# Patient Record
Sex: FEMALE | Race: WHITE | NOT HISPANIC OR LATINO | Employment: UNEMPLOYED | URBAN - METROPOLITAN AREA
[De-identification: names, ages, dates, MRNs, and addresses within clinical notes are randomized per-mention and may not be internally consistent; named-entity substitution may affect disease eponyms.]

---

## 2023-04-03 LAB
ALANINE AMINOTRANSFERASE (SGPT) (U/L) IN SER/PLAS: 22 U/L (ref 7–45)
ALBUMIN (G/DL) IN SER/PLAS: 4.6 G/DL (ref 3.4–5)
ALKALINE PHOSPHATASE (U/L) IN SER/PLAS: 79 U/L (ref 33–136)
ASPARTATE AMINOTRANSFERASE (SGOT) (U/L) IN SER/PLAS: 23 U/L (ref 9–39)
BILIRUBIN DIRECT (MG/DL) IN SER/PLAS: 0.1 MG/DL (ref 0–0.3)
BILIRUBIN TOTAL (MG/DL) IN SER/PLAS: 0.5 MG/DL (ref 0–1.2)
PROTEIN TOTAL: 7.5 G/DL (ref 6.4–8.2)

## 2023-04-04 LAB
HEPATITIS A TOTAL AB INTERPRETATION: NONREACTIVE
HEPATITIS B VIRUS CORE AB (PRESENCE) IN SER/PLAS BY IMM: NONREACTIVE
HEPATITIS B VIRUS SURFACE AB (MIU/ML) IN SERUM: <3.1 MIU/ML
HEPATITIS B VIRUS SURFACE AG PRESENCE IN SERUM: NONREACTIVE
HEPATITIS C VIRUS AB PRESENCE IN SERUM: NONREACTIVE

## 2023-09-22 ENCOUNTER — APPOINTMENT (OUTPATIENT)
Dept: PRIMARY CARE | Facility: CLINIC | Age: 68
End: 2023-09-22
Payer: MEDICARE

## 2023-09-30 ENCOUNTER — ANCILLARY PROCEDURE (OUTPATIENT)
Dept: RADIOLOGY | Facility: CLINIC | Age: 68
End: 2023-09-30
Payer: MEDICARE

## 2023-09-30 DIAGNOSIS — M25.561 PAIN IN RIGHT KNEE: ICD-10-CM

## 2023-09-30 PROCEDURE — 73562 X-RAY EXAM OF KNEE 3: CPT | Mod: RIGHT SIDE | Performed by: RADIOLOGY

## 2023-09-30 PROCEDURE — 73562 X-RAY EXAM OF KNEE 3: CPT | Mod: RT

## 2023-10-03 ENCOUNTER — LAB (OUTPATIENT)
Dept: LAB | Facility: LAB | Age: 68
End: 2023-10-03
Payer: MEDICARE

## 2023-10-03 DIAGNOSIS — K76.0 FATTY (CHANGE OF) LIVER, NOT ELSEWHERE CLASSIFIED: Primary | ICD-10-CM

## 2023-10-03 LAB
ALBUMIN SERPL BCP-MCNC: 4.6 G/DL (ref 3.4–5)
ALP SERPL-CCNC: 93 U/L (ref 33–136)
ALT SERPL W P-5'-P-CCNC: 15 U/L (ref 7–45)
AST SERPL W P-5'-P-CCNC: 16 U/L (ref 9–39)
BILIRUB DIRECT SERPL-MCNC: 0.1 MG/DL (ref 0–0.3)
BILIRUB SERPL-MCNC: 0.6 MG/DL (ref 0–1.2)
PROT SERPL-MCNC: 7.4 G/DL (ref 6.4–8.2)

## 2023-10-03 PROCEDURE — 36415 COLL VENOUS BLD VENIPUNCTURE: CPT

## 2023-10-04 ENCOUNTER — OFFICE VISIT (OUTPATIENT)
Dept: ORTHOPEDIC SURGERY | Facility: CLINIC | Age: 68
End: 2023-10-04
Payer: MEDICARE

## 2023-10-04 VITALS — HEIGHT: 66 IN | WEIGHT: 160 LBS | BODY MASS INDEX: 25.71 KG/M2

## 2023-10-04 DIAGNOSIS — M25.561 ACUTE PAIN OF RIGHT KNEE: Primary | ICD-10-CM

## 2023-10-04 DIAGNOSIS — M17.11 PRIMARY OSTEOARTHRITIS OF RIGHT KNEE: ICD-10-CM

## 2023-10-04 PROCEDURE — 1160F RVW MEDS BY RX/DR IN RCRD: CPT

## 2023-10-04 PROCEDURE — 1159F MED LIST DOCD IN RCRD: CPT

## 2023-10-04 PROCEDURE — 1126F AMNT PAIN NOTED NONE PRSNT: CPT

## 2023-10-04 PROCEDURE — 20610 DRAIN/INJ JOINT/BURSA W/O US: CPT

## 2023-10-04 PROCEDURE — 99203 OFFICE O/P NEW LOW 30 MIN: CPT

## 2023-10-04 RX ORDER — LIDOCAINE HYDROCHLORIDE 5 MG/ML
4 INJECTION, SOLUTION INFILTRATION; PERINEURAL
Status: COMPLETED | OUTPATIENT
Start: 2023-10-04 | End: 2023-10-04

## 2023-10-04 RX ORDER — TRIAMCINOLONE ACETONIDE 40 MG/ML
10 INJECTION, SUSPENSION INTRA-ARTICULAR; INTRAMUSCULAR
Status: COMPLETED | OUTPATIENT
Start: 2023-10-04 | End: 2023-10-04

## 2023-10-04 RX ADMIN — TRIAMCINOLONE ACETONIDE 10 MG: 40 INJECTION, SUSPENSION INTRA-ARTICULAR; INTRAMUSCULAR at 14:02

## 2023-10-04 RX ADMIN — LIDOCAINE HYDROCHLORIDE 4 ML: 5 INJECTION, SOLUTION INFILTRATION; PERINEURAL at 14:02

## 2023-10-04 ASSESSMENT — PAIN SCALES - GENERAL: PAINLEVEL_OUTOF10: 10 - WORST POSSIBLE PAIN

## 2023-10-04 ASSESSMENT — PAIN - FUNCTIONAL ASSESSMENT: PAIN_FUNCTIONAL_ASSESSMENT: 0-10

## 2023-10-04 NOTE — LETTER
October 4, 2023     Rica Hauser DO  [address]  [address]  [facility] [address]    Patient: Nirali Rodarte   YOB: 1955   Date of Visit: 10/4/2023       Dear Dr. Rica Hauser DO:    Thank you for referring Nirali Rodarte to me for evaluation. Below are my notes for this consultation.  If you have questions, please do not hesitate to call me. I look forward to following your patient along with you.       Sincerely,     Sonia Charles PA-C      CC: No Recipients  ______________________________________________________________________________________    HPI  Nirali Rodarte is a 68 y.o. female , accompanied by her sister in law, in office today for   Chief Complaint   Patient presents with   • Right Knee - Pain, Edema     One month ago today   .  she denies any injury or illness to the knee, does admit that she was more active prior to the knee starting hurting.  Went to urgent care where they placed into knee immobilizing brace and prescribed a steroid pack which she has not started taking.  Knee hurts all over, hurts to bend and stand.  Has been icing it, having difficulty with elevation.    Past Medical History:  History of liver issues, osteoporosis.    Medication  No current outpatient medications on file prior to visit.     No current facility-administered medications on file prior to visit.       Physical Exam  Constitutional: well developed, well nourished female in no acute distress  Psychiatric: normal mood, appropriate affect  Eyes: sclera anicteric  HENT: normocephalic/atraumatic  CV: regular rate and rhythm   Respiratory: non labored breathing  Integumentary: no rash  Neurological: moves all extremities    Right Knee Exam     Tenderness   The patient is experiencing tenderness in the medial joint line and lateral joint line.    Range of Motion   Extension:  5   Flexion:  100     Tests   Varus: negative Valgus: negative  Lachman:  Anterior - negative    Posterior - negative  Patellar  apprehension: negative    Other   Erythema: absent  Scars: absent  Sensation: normal  Swelling: mild  Effusion: effusion present          L Inj/Asp: R knee on 10/4/2023 2:02 PM  Indications: pain and joint swelling  Details: 22 G needle, anterolateral approach  Medications: 10 mg triamcinolone acetonide 40 mg/mL; 4 mL lidocaine 5 mg/mL (0.5 %)  Outcome: tolerated well, no immediate complications  Procedure, treatment alternatives, risks and benefits explained, specific risks discussed. Consent was given by the patient. Immediately prior to procedure a time out was called to verify the correct patient, procedure, equipment, support staff and site/side marked as required. Patient was prepped and draped in the usual sterile fashion.             Imaging/Lab:  X-rays were taken 9/30/23 which were reviewed by myself and read by radiology and show bone demineralizatin, moderate tricompartmental degenerative changes worst in the patellofemoral compartment.  No effusion of joint.  No acute fracture or dislocation.      Assessment  Assessment: Right knee pain, right knee osteoarthritis    Plan  Plan:  History, physical exam, and imaging were reviewed with patient. Discussed conservative treatment for her knee pain including RICE, bracing, injections, and/or PT.  She would like to try an injection today as well as continuing to brace and ice.  The right knee was injected as above.  Medication: Discussed not taking the prednisone pack since she is getting the injection.  Follow Up: As needed if pain persists or gets worse    All questions were answered for the patient prior to end of exam and patient addressed their understanding.    Sonia Charles PA-C  10/04/23

## 2023-10-04 NOTE — PROGRESS NOTES
HPI  Nirali Rodarte is a 68 y.o. female , accompanied by her sister in law, in office today for   Chief Complaint   Patient presents with    Right Knee - Pain, Edema     One month ago today   .  she denies any injury or illness to the knee, does admit that she was more active prior to the knee starting hurting.  Went to urgent care where they placed into knee immobilizing brace and prescribed a steroid pack which she has not started taking.  Knee hurts all over, hurts to bend and stand.  Has been icing it, having difficulty with elevation.    Past Medical History:  History of liver issues, osteoporosis.    Medication  No current outpatient medications on file prior to visit.     No current facility-administered medications on file prior to visit.       Physical Exam  Constitutional: well developed, well nourished female in no acute distress  Psychiatric: normal mood, appropriate affect  Eyes: sclera anicteric  HENT: normocephalic/atraumatic  CV: regular rate and rhythm   Respiratory: non labored breathing  Integumentary: no rash  Neurological: moves all extremities    Right Knee Exam     Tenderness   The patient is experiencing tenderness in the medial joint line and lateral joint line.    Range of Motion   Extension:  5   Flexion:  100     Tests   Varus: negative Valgus: negative  Lachman:  Anterior - negative    Posterior - negative  Patellar apprehension: negative    Other   Erythema: absent  Scars: absent  Sensation: normal  Swelling: mild  Effusion: effusion present          L Inj/Asp: R knee on 10/4/2023 2:02 PM  Indications: pain and joint swelling  Details: 22 G needle, anterolateral approach  Medications: 10 mg triamcinolone acetonide 40 mg/mL; 4 mL lidocaine 5 mg/mL (0.5 %)  Outcome: tolerated well, no immediate complications  Procedure, treatment alternatives, risks and benefits explained, specific risks discussed. Consent was given by the patient. Immediately prior to procedure a time out was called to  verify the correct patient, procedure, equipment, support staff and site/side marked as required. Patient was prepped and draped in the usual sterile fashion.             Imaging/Lab:  X-rays were taken 9/30/23 which were reviewed by myself and read by radiology and show bone demineralizatin, moderate tricompartmental degenerative changes worst in the patellofemoral compartment.  No effusion of joint.  No acute fracture or dislocation.      Assessment  Assessment: Right knee pain, right knee osteoarthritis    Plan  Plan:  History, physical exam, and imaging were reviewed with patient. Discussed conservative treatment for her knee pain including RICE, bracing, injections, and/or PT.  She would like to try an injection today as well as continuing to brace and ice.  The right knee was injected as above.  Medication: Discussed not taking the prednisone pack since she is getting the injection.  Follow Up: As needed if pain persists or gets worse    All questions were answered for the patient prior to end of exam and patient addressed their understanding.    Sonia Charles PA-C  10/04/23

## 2023-10-05 DIAGNOSIS — K76.0 NON-ALCOHOLIC FATTY LIVER DISEASE: Primary | ICD-10-CM

## 2023-10-06 ENCOUNTER — APPOINTMENT (OUTPATIENT)
Dept: PRIMARY CARE | Facility: CLINIC | Age: 68
End: 2023-10-06
Payer: MEDICARE

## 2023-10-19 ENCOUNTER — OFFICE VISIT (OUTPATIENT)
Dept: PRIMARY CARE | Facility: CLINIC | Age: 68
End: 2023-10-19
Payer: MEDICARE

## 2023-10-19 VITALS
WEIGHT: 163 LBS | SYSTOLIC BLOOD PRESSURE: 192 MMHG | DIASTOLIC BLOOD PRESSURE: 61 MMHG | HEART RATE: 65 BPM | OXYGEN SATURATION: 100 % | BODY MASS INDEX: 26.31 KG/M2

## 2023-10-19 DIAGNOSIS — E55.9 VITAMIN D DEFICIENCY: ICD-10-CM

## 2023-10-19 DIAGNOSIS — Z23 NEED FOR INFLUENZA VACCINATION: ICD-10-CM

## 2023-10-19 DIAGNOSIS — M22.2X2 PATELLOFEMORAL SYNDROME OF BOTH KNEES: Primary | ICD-10-CM

## 2023-10-19 DIAGNOSIS — E78.5 DYSLIPIDEMIA: ICD-10-CM

## 2023-10-19 DIAGNOSIS — M22.2X1 PATELLOFEMORAL SYNDROME OF BOTH KNEES: Primary | ICD-10-CM

## 2023-10-19 DIAGNOSIS — M72.2 PLANTAR FASCIITIS OF RIGHT FOOT: ICD-10-CM

## 2023-10-19 PROCEDURE — 90662 IIV NO PRSV INCREASED AG IM: CPT | Performed by: INTERNAL MEDICINE

## 2023-10-19 PROCEDURE — 1160F RVW MEDS BY RX/DR IN RCRD: CPT | Performed by: INTERNAL MEDICINE

## 2023-10-19 PROCEDURE — 1126F AMNT PAIN NOTED NONE PRSNT: CPT | Performed by: INTERNAL MEDICINE

## 2023-10-19 PROCEDURE — 1036F TOBACCO NON-USER: CPT | Performed by: INTERNAL MEDICINE

## 2023-10-19 PROCEDURE — G0008 ADMIN INFLUENZA VIRUS VAC: HCPCS | Performed by: INTERNAL MEDICINE

## 2023-10-19 PROCEDURE — 99213 OFFICE O/P EST LOW 20 MIN: CPT | Performed by: INTERNAL MEDICINE

## 2023-10-19 PROCEDURE — 1159F MED LIST DOCD IN RCRD: CPT | Performed by: INTERNAL MEDICINE

## 2023-10-19 ASSESSMENT — PATIENT HEALTH QUESTIONNAIRE - PHQ9
2. FEELING DOWN, DEPRESSED OR HOPELESS: SEVERAL DAYS
1. LITTLE INTEREST OR PLEASURE IN DOING THINGS: SEVERAL DAYS
SUM OF ALL RESPONSES TO PHQ9 QUESTIONS 1 AND 2: 2

## 2023-10-19 NOTE — PROGRESS NOTES
Subjective   Patient ID: Nirali Rodarte is a 68 y.o. female who presents for Follow-up (Right knee / concerns ).    She couldn't walk, started 9/6  Was very swollen and was icing it.   She went to urgent care because she couldn't move  She had xrays and got a prescription as well.   She went to the ortho clinic and got her knee injected with steroids and has been better  She took it easy the first week, so she started walking a little more  She is not sure when she can go back to her classes, at the [employer]    She was hiking 3-4 times a week and classes at the [employer]  She gets knee pain, going down a hill was more painful going down then up    In the early summer was having pain in the right foot  Her great toe knuckle got red and tender  Did not hurt at night  She could not walk barefoot through the house  First out of bed, was the worse  Only hurt on the top of the foot  Did not hurt to put on shoes, shoes felt better               Review of Systems    Objective   BP (!) 192/61   Pulse 65   Wt 73.9 kg (163 lb)   SpO2 100%   BMI 26.31 kg/m²     Physical Exam  Cardiovascular:      Rate and Rhythm: Normal rate and regular rhythm.      Pulses: Normal pulses.   Musculoskeletal:      Comments: Right knee  Crepitans on extension  Negative rohan and drawer test  No warmth   Slight suprapatellar effusion    Right ankle, no effusion, not warm  No foot or toe swelling  Tight calf noted  No achilles tenderness   Psychiatric:      Comments: Pt tearful when taking about her brother         Assessment/Plan          Patient Instructions   Right knee pain  Reviewed xray, likely more related to patellofemoral syndrome  Go to PT for treatment and home exercise program but if not better to see ortho and get MRI to check for meniscal tear    Flu shot today    Right foot pain sounds a bit like plantar fascitis, do calf stretches, runners stretch, or heel drop, 2-3 times a day    Great job on diet, weight loss, and limiting  alcohol    Bp is high her but has been fine at home, check home bp and call in readings, or drop off

## 2023-10-19 NOTE — PATIENT INSTRUCTIONS
Right knee pain  Reviewed xray, likely more related to patellofemoral syndrome  Go to PT for treatment and home exercise program but if not better to see ortho and get MRI to check for meniscal tear    Flu shot today    Right foot pain sounds a bit like plantar fascitis, do calf stretches, runners stretch, or heel drop, 2-3 times a day    Great job on diet, weight loss, and limiting alcohol    Bp is high her but has been fine at home, check home bp and call in readings, or drop off

## 2023-10-19 NOTE — PROGRESS NOTES
Subjective   Patient ID: Nirali Rodarte is a 68 y.o. female who presents for Follow-up (Right knee / concerns ).    HPI     Review of Systems    Objective   Wt 73.9 kg (163 lb)   BMI 26.31 kg/m²     Physical Exam    Assessment/Plan

## 2023-10-24 ENCOUNTER — EVALUATION (OUTPATIENT)
Dept: PHYSICAL THERAPY | Facility: CLINIC | Age: 68
End: 2023-10-24
Payer: MEDICARE

## 2023-10-24 DIAGNOSIS — M22.2X2 PATELLOFEMORAL PAIN SYNDROME OF BOTH KNEES: ICD-10-CM

## 2023-10-24 DIAGNOSIS — M22.2X1 PATELLOFEMORAL PAIN SYNDROME OF BOTH KNEES: ICD-10-CM

## 2023-10-24 DIAGNOSIS — M25.661 DECREASED RANGE OF MOTION (ROM) OF RIGHT KNEE: Primary | ICD-10-CM

## 2023-10-24 DIAGNOSIS — M22.2X1 PATELLOFEMORAL SYNDROME OF BOTH KNEES: ICD-10-CM

## 2023-10-24 DIAGNOSIS — M22.2X2 PATELLOFEMORAL SYNDROME OF BOTH KNEES: ICD-10-CM

## 2023-10-24 DIAGNOSIS — R29.898 WEAKNESS OF RIGHT LOWER EXTREMITY: ICD-10-CM

## 2023-10-24 PROBLEM — M22.2X9 PATELLOFEMORAL PAIN SYNDROME: Status: ACTIVE | Noted: 2023-10-24

## 2023-10-24 PROCEDURE — 97110 THERAPEUTIC EXERCISES: CPT | Mod: GP | Performed by: PHYSICAL THERAPIST

## 2023-10-24 PROCEDURE — 97161 PT EVAL LOW COMPLEX 20 MIN: CPT | Mod: GP | Performed by: PHYSICAL THERAPIST

## 2023-10-24 ASSESSMENT — ENCOUNTER SYMPTOMS
DEPRESSION: 0
LOSS OF SENSATION IN FEET: 0
OCCASIONAL FEELINGS OF UNSTEADINESS: 1

## 2023-10-24 ASSESSMENT — PAIN - FUNCTIONAL ASSESSMENT: PAIN_FUNCTIONAL_ASSESSMENT: 0-10

## 2023-10-24 ASSESSMENT — PAIN SCALES - GENERAL: PAINLEVEL_OUTOF10: 2

## 2023-10-24 NOTE — Clinical Note
October 24, 2023     Patient: Nirali Rodarte   YOB: 1955   Date of Visit: 10/24/2023       To Whom it May Concern:    Nirali Rodarte was seen in my clinic on 10/24/2023. She {Return to school/sport:19928}.    If you have any questions or concerns, please don't hesitate to call.         Sincerely,          Yana Rose, PT        CC: No Recipients

## 2023-10-24 NOTE — PROGRESS NOTES
Physical Therapy    Physical Therapy Evaluation and Treatment      Patient Name: Nirali Rodarte  MRN: [id number]  Today's Date: 10/24/2023  Time Calculation  Start Time: 1651  Stop Time: 1735  Time Calculation (min): 44 min      Assessment:  Patient is a 68 year old who presents to Physical therapy secondary to pain in R knee. Upon PT evaluation the patient is presenting with the following deficits: significant weakness in R hip adductors, decreased ROM R knee flexion and extension, trigger pointed noted in R quad, and decreased gait mechanics and poor eccentric control on stairs.     The above deficits are limiting patient's ability to hike/perform daily activities without pain.  Secondary to the above deficits the patient will benefit from skilled PT intervention to allow the patient to progress to the goal of being able to walk/hike without pain.  PT will monitor progress towards goals and adjust intervention as appropriate.     PT Assessment Results: Decreased strength, Decreased range of motion, Decreased endurance, Decreased mobility, Pain  Rehab Prognosis: Excellent    Plan:  Treatment/Interventions: Cryotherapy, Dry needling, Education/ Instruction, Electrical stimulation, Gait training, Manual therapy, Therapeutic activities, Taping techniques, Therapeutic exercises  PT Plan: Skilled PT  PT Frequency: 2 times per week  Duration: 4 weeks  Rehab Potential: Excellent  Plan of Care Agreement: Patient    Current Problem:   1. Decreased range of motion (ROM) of right knee        2. Patellofemoral syndrome of both knees  Referral to Physical Therapy    Follow Up In Physical Therapy      3. Weakness of right lower extremity        4. Patellofemoral pain syndrome of both knees            Subjective    General:  General  Reason for Referral: Patellofemoral pain syndrom  Referred By: Dr. Hauser  Past Medical History Relevant to Rehab: nonePain started in September in R knee as she was hiking and has been gradually  increasing.  Patient did have an xray in Sept and then had injection 10/4/23 which did decrease pain.  Patient did have pain yesterday but today minimal pain.  Before injection pain was 10/10 and she could not walk and yesterday pain was 6/10.  Patient reported that walking and weightbearing increased pain.  Patient does have some difficulty bending and discomfort with hyperextension when walking on uneven surfaces.   Before injection she could not get comfortable.  Patient reported grinding in B knees.    Precautions:  Patient does rely on UE support and does perform step to with gait   Precautions  STEADI Fall Risk Score (The score of 4 or more indicates an increased risk of falling): 3 (message sent to Dr. Hauser regarding PHQ2)  Precautions Comment: none    Pain:  Pain Assessment  Pain Assessment: 0-10  Pain Score: 2  Pain Type: Acute pain  Pain Location: Knee  Pain Orientation: Right  Home Living:   Lives alone   Prior Level of Function:  Prior Function Per Pt/Caregiver Report  Level of Ruth: Independent with ADLs and functional transfers (Very active)  Vocational: Retired  Leisure: hikes, exercises 3 x per weekIndependent    Objective        General Assessments:  Flexibility Comment: TIghtness in B hamstrings    Palpation Comment: pain to palpation R medial knee joint line   Functional Assessments:  Gait Comment: Decreased weightbearing R LE, genu valgum  , Stairs Comment: Reciprical pattern but heavy reliance on UE and poor R knee control when leading with L  , Transfers Comment: Independent  , and Comment: Edema noted along R inferior patella and L medial patella.  R>L - B ankle pronation       Hip AROM WFL unless documented below     Hip PROM WFL unless documented below     Specific Lower Extremity MMT WFL unless documented below  R Iliopsoas: (5/5): 3+  L Iliopsoas: (5/5): 4+  R Gluteals (prone): (5/5): 4-  L Gluteals (prone): (5/5): 4  R Gluteals (sidelying): (5/5): 3+  L Gluteals (sidelying):  (5/5): 4+  R knee flexion: (5/5): 4+  L knee flexion: (5/5): 5  R knee extension: (5/5): 5  L knee extension: (5/5): 5       Special Tests Negative unless documented below  Special Tests Negative: yes         Flexibility  R hamstrings: Tight  L hamstrings: Tight  R hip flexors: WNL  L hip flexors: WNL  R quads: fair  L quads: Fair, KNEE    Knee Functional Rating Scale  LEFS /80: 27         Knee Palpation/Joint Mobility Assessment  Palpation/Joint Mobility Comment: SIgnificant trigger points along R distal quad - poor patellar mobility with lateral tracking  Knee AROM WFL unless documented below  R knee flexion: (140°): 95 with pain  L knee flexion: (140°): 135  R knee extension: (0°): lacking 5 from full extension  L knee extension: (0°): 0  Knee PROM WFL unless documented below  R knee flexion: (140°): 100 with pain  L knee flexion: (140°): 140  R knee extension: (0°): lacking 3 from full extension  L knee extension: (0°): 0    Special Tests Negative unless documented below  Special Tests Negative: yes       Ankle AROM WFL unless documented below        Ankle MMT WFL unless documented below            OP EDUCATION:  Education  Individual(s) Educated: Patient  Education Provided: Anatomy, Home Exercise Program, POC  Patient/Caregiver Demonstrated Understanding: yes  Plan of Care Discussed and Agreed Upon: yes  Patient Response to Education: Patient/Caregiver Performed Return Demonstration of Exercises/Activities, Patient/Caregiver Verbalized Understanding of Information  Access Code: [email or web address]  URL: [email or web address]  Date: 10/24/2023  Prepared by: Yana Rose    Exercises  - Supine Hip Adduction Isometric with Ball  - 1 x daily - 7 x weekly - 2 sets - 10 reps - 5 hold  - Supine Active Straight Leg Raise  - 1 x daily - 7 x weekly - 2 sets - 10 reps  - Straight Leg Raise with External Rotation  - 1 x daily - 7 x weekly - 2 sets - 10 reps    Goals:  Active       PT Problem       Patient to be  able to hike x 2 miles without having to stop secondary to an increase in pain       Start:  10/24/23    Expected End:  11/21/23            Patient to demonstrate improved R LE strength to 4+/5 to allow for improved gait mechanics        Start:  10/24/23    Expected End:  11/21/23            Patient to demonstrate improved R knee ROM to 0-130 to allow for improved ability to perform daily activities        Start:  10/24/23    Expected End:  11/21/23            Patient will demonstrate independence and compliance with safe and appropriate HEP for pain reduction, ROM/flexibility, core and BLE strength and postural correction.        Start:  10/24/23    Expected End:  11/21/23            Patient will demonstrate improved score on LEFS  by 8 points to demonstrate improved subjective functional mobility and ability to perform daily activities.          Start:  10/24/23    Expected End:  11/21/23            Patient to report pain in R knee 1/10 at worst to allow for ability to return to normal activity        Start:  10/24/23    Expected End:  11/21/23

## 2023-10-24 NOTE — Clinical Note
October 24, 2023     Patient: Nirali Rodarte   YOB: 1955   Date of Visit: 10/24/2023       To Whom It May Concern:    It is my medical opinion that Nirali Rodarte {Work release (duty restriction):55427}.    If you have any questions or concerns, please don't hesitate to call.         Sincerely,        Yana Rose, PT    CC: No Recipients

## 2023-10-26 ENCOUNTER — LAB (OUTPATIENT)
Dept: LAB | Facility: LAB | Age: 68
End: 2023-10-26
Payer: MEDICARE

## 2023-10-26 DIAGNOSIS — E78.5 DYSLIPIDEMIA: ICD-10-CM

## 2023-10-26 DIAGNOSIS — E55.9 VITAMIN D DEFICIENCY: ICD-10-CM

## 2023-10-26 PROBLEM — K57.90 DIVERTICULOSIS: Status: ACTIVE | Noted: 2023-10-26

## 2023-10-26 PROBLEM — D18.01 HEMANGIOMA OF SKIN AND SUBCUTANEOUS TISSUE: Status: ACTIVE | Noted: 2023-01-11

## 2023-10-26 PROBLEM — D22.5 MELANOCYTIC NEVI OF TRUNK: Status: ACTIVE | Noted: 2023-01-11

## 2023-10-26 PROBLEM — L50.9 URTICARIA: Status: ACTIVE | Noted: 2023-10-26

## 2023-10-26 PROBLEM — M76.30 IT BAND SYNDROME, UNSPECIFIED LATERALITY: Status: ACTIVE | Noted: 2023-10-26

## 2023-10-26 PROBLEM — R13.19 ESOPHAGEAL DYSPHAGIA: Status: ACTIVE | Noted: 2023-10-26

## 2023-10-26 PROBLEM — L82.1 OTHER SEBORRHEIC KERATOSIS: Status: ACTIVE | Noted: 2023-01-11

## 2023-10-26 PROBLEM — B97.7 HPV (HUMAN PAPILLOMA VIRUS) INFECTION: Status: ACTIVE | Noted: 2023-10-26

## 2023-10-26 PROBLEM — L70.9 ADULT ACNE: Status: ACTIVE | Noted: 2023-10-26

## 2023-10-26 PROBLEM — G89.29 CHRONIC RUQ PAIN: Status: ACTIVE | Noted: 2023-10-26

## 2023-10-26 PROBLEM — L91.8 OTHER HYPERTROPHIC DISORDERS OF THE SKIN: Status: ACTIVE | Noted: 2023-01-11

## 2023-10-26 PROBLEM — D22.60 MELANOCYTIC NEVI OF UNSPECIFIED UPPER LIMB, INCLUDING SHOULDER: Status: ACTIVE | Noted: 2023-01-11

## 2023-10-26 PROBLEM — R10.9 FLANK PAIN: Status: ACTIVE | Noted: 2023-10-26

## 2023-10-26 PROBLEM — D22.70 MELANOCYTIC NEVI OF UNSPECIFIED LOWER LIMB, INCLUDING HIP: Status: ACTIVE | Noted: 2023-01-11

## 2023-10-26 PROBLEM — F41.9 ANXIETY: Status: ACTIVE | Noted: 2023-10-26

## 2023-10-26 PROBLEM — R10.2 SUPRAPUBIC ABDOMINAL PAIN: Status: ACTIVE | Noted: 2023-10-26

## 2023-10-26 PROBLEM — L81.4 OTHER MELANIN HYPERPIGMENTATION: Status: ACTIVE | Noted: 2023-01-11

## 2023-10-26 PROBLEM — M70.61 TROCHANTERIC BURSITIS OF RIGHT HIP: Status: ACTIVE | Noted: 2023-10-26

## 2023-10-26 PROBLEM — M81.0 OSTEOPOROSIS: Status: ACTIVE | Noted: 2023-10-26

## 2023-10-26 PROBLEM — R87.810 CERVICAL HIGH RISK HPV (HUMAN PAPILLOMAVIRUS) TEST POSITIVE: Status: ACTIVE | Noted: 2023-10-26

## 2023-10-26 PROBLEM — M81.0 POSTMENOPAUSAL BONE LOSS: Status: ACTIVE | Noted: 2023-10-26

## 2023-10-26 PROBLEM — Z78.0 ASYMPTOMATIC MENOPAUSAL STATE: Status: ACTIVE | Noted: 2023-10-26

## 2023-10-26 PROBLEM — R01.1 HEART MURMUR: Status: ACTIVE | Noted: 2023-10-26

## 2023-10-26 PROBLEM — K76.0 HEPATIC STEATOSIS: Status: ACTIVE | Noted: 2023-10-26

## 2023-10-26 PROBLEM — R10.11 CHRONIC RUQ PAIN: Status: ACTIVE | Noted: 2023-10-26

## 2023-10-26 LAB
25(OH)D3 SERPL-MCNC: 31 NG/ML (ref 30–100)
ALBUMIN SERPL BCP-MCNC: 4.3 G/DL (ref 3.4–5)
ALP SERPL-CCNC: 81 U/L (ref 33–136)
ALT SERPL W P-5'-P-CCNC: 21 U/L (ref 7–45)
ANION GAP SERPL CALC-SCNC: 14 MMOL/L (ref 10–20)
AST SERPL W P-5'-P-CCNC: 21 U/L (ref 9–39)
BASOPHILS # BLD AUTO: 0.07 X10*3/UL (ref 0–0.1)
BASOPHILS NFR BLD AUTO: 1.3 %
BILIRUB SERPL-MCNC: 0.6 MG/DL (ref 0–1.2)
BUN SERPL-MCNC: 16 MG/DL (ref 6–23)
CALCIUM SERPL-MCNC: 9.7 MG/DL (ref 8.6–10.3)
CHLORIDE SERPL-SCNC: 104 MMOL/L (ref 98–107)
CHOLEST SERPL-MCNC: 178 MG/DL (ref 0–199)
CHOLESTEROL/HDL RATIO: 2
CO2 SERPL-SCNC: 26 MMOL/L (ref 21–32)
CREAT SERPL-MCNC: 0.74 MG/DL (ref 0.5–1.05)
EOSINOPHIL # BLD AUTO: 0.14 X10*3/UL (ref 0–0.7)
EOSINOPHIL NFR BLD AUTO: 2.6 %
ERYTHROCYTE [DISTWIDTH] IN BLOOD BY AUTOMATED COUNT: 13.1 % (ref 11.5–14.5)
GFR SERPL CREATININE-BSD FRML MDRD: 88 ML/MIN/1.73M*2
GLUCOSE SERPL-MCNC: 90 MG/DL (ref 74–99)
HCT VFR BLD AUTO: 41.6 % (ref 36–46)
HDLC SERPL-MCNC: 87.3 MG/DL
HGB BLD-MCNC: 13.2 G/DL (ref 12–16)
IMM GRANULOCYTES # BLD AUTO: 0.02 X10*3/UL (ref 0–0.7)
IMM GRANULOCYTES NFR BLD AUTO: 0.4 % (ref 0–0.9)
LDLC SERPL CALC-MCNC: 81 MG/DL
LYMPHOCYTES # BLD AUTO: 1.2 X10*3/UL (ref 1.2–4.8)
LYMPHOCYTES NFR BLD AUTO: 21.9 %
MCH RBC QN AUTO: 28.9 PG (ref 26–34)
MCHC RBC AUTO-ENTMCNC: 31.7 G/DL (ref 32–36)
MCV RBC AUTO: 91 FL (ref 80–100)
MONOCYTES # BLD AUTO: 0.49 X10*3/UL (ref 0.1–1)
MONOCYTES NFR BLD AUTO: 8.9 %
NEUTROPHILS # BLD AUTO: 3.57 X10*3/UL (ref 1.2–7.7)
NEUTROPHILS NFR BLD AUTO: 64.9 %
NON HDL CHOLESTEROL: 91 MG/DL (ref 0–149)
NRBC BLD-RTO: 0 /100 WBCS (ref 0–0)
PLATELET # BLD AUTO: 260 X10*3/UL (ref 150–450)
PMV BLD AUTO: 10.7 FL (ref 7.5–11.5)
POTASSIUM SERPL-SCNC: 3.9 MMOL/L (ref 3.5–5.3)
PROT SERPL-MCNC: 7.4 G/DL (ref 6.4–8.2)
RBC # BLD AUTO: 4.57 X10*6/UL (ref 4–5.2)
SODIUM SERPL-SCNC: 140 MMOL/L (ref 136–145)
TRIGL SERPL-MCNC: 49 MG/DL (ref 0–149)
TSH SERPL-ACNC: 2.03 MIU/L (ref 0.44–3.98)
VLDL: 10 MG/DL (ref 0–40)
WBC # BLD AUTO: 5.5 X10*3/UL (ref 4.4–11.3)

## 2023-10-26 PROCEDURE — 85025 COMPLETE CBC W/AUTO DIFF WBC: CPT

## 2023-10-26 PROCEDURE — 36415 COLL VENOUS BLD VENIPUNCTURE: CPT

## 2023-10-26 PROCEDURE — 82306 VITAMIN D 25 HYDROXY: CPT

## 2023-10-26 PROCEDURE — 80053 COMPREHEN METABOLIC PANEL: CPT

## 2023-10-26 PROCEDURE — 84443 ASSAY THYROID STIM HORMONE: CPT

## 2023-10-26 PROCEDURE — 80061 LIPID PANEL: CPT

## 2023-10-26 RX ORDER — ASPIRIN 325 MG
TABLET, DELAYED RELEASE (ENTERIC COATED) ORAL
COMMUNITY
Start: 2023-02-10 | End: 2023-12-21 | Stop reason: WASHOUT

## 2023-10-26 RX ORDER — TRETINOIN 0.25 MG/G
1 CREAM TOPICAL NIGHTLY
COMMUNITY
Start: 2023-01-11 | End: 2023-11-02

## 2023-10-26 RX ORDER — SODIUM FLUORIDE 5 MG/G
GEL, DENTIFRICE DENTAL
COMMUNITY
Start: 2022-08-23 | End: 2024-04-12 | Stop reason: WASHOUT

## 2023-10-26 RX ORDER — AMOXICILLIN AND CLAVULANATE POTASSIUM 875; 125 MG/1; MG/1
1 TABLET, FILM COATED ORAL EVERY 12 HOURS
COMMUNITY
Start: 2023-08-24 | End: 2023-11-02

## 2023-10-27 ENCOUNTER — TREATMENT (OUTPATIENT)
Dept: PHYSICAL THERAPY | Facility: CLINIC | Age: 68
End: 2023-10-27
Payer: MEDICARE

## 2023-10-27 DIAGNOSIS — R29.898 WEAKNESS OF RIGHT LOWER EXTREMITY: ICD-10-CM

## 2023-10-27 DIAGNOSIS — M22.2X2 PATELLOFEMORAL PAIN SYNDROME OF BOTH KNEES: ICD-10-CM

## 2023-10-27 DIAGNOSIS — M22.2X1 PATELLOFEMORAL PAIN SYNDROME OF BOTH KNEES: ICD-10-CM

## 2023-10-27 DIAGNOSIS — M22.2X1 PATELLOFEMORAL SYNDROME OF BOTH KNEES: ICD-10-CM

## 2023-10-27 DIAGNOSIS — M22.2X2 PATELLOFEMORAL SYNDROME OF BOTH KNEES: ICD-10-CM

## 2023-10-27 DIAGNOSIS — M25.661 DECREASED RANGE OF MOTION (ROM) OF RIGHT KNEE: Primary | ICD-10-CM

## 2023-10-27 PROCEDURE — 97140 MANUAL THERAPY 1/> REGIONS: CPT | Mod: GP | Performed by: PHYSICAL THERAPIST

## 2023-10-27 PROCEDURE — 97110 THERAPEUTIC EXERCISES: CPT | Mod: GP | Performed by: PHYSICAL THERAPIST

## 2023-10-27 ASSESSMENT — PAIN SCALES - GENERAL: PAINLEVEL_OUTOF10: 2

## 2023-10-27 NOTE — PROGRESS NOTES
Physical Therapy    Physical Therapy Treatment    Patient Name: Nirali Rodarte  MRN: [id number]  Today's Date: 10/27/2023  Time Calculation  Start Time: 0900  Stop Time: 0944  Time Calculation (min): 44 min      Assessment:    Patient tolerated addition of exercises well without pain.  Patient had significant trigger points in distal quad with good response with manual today.  Patient was educated to continue original HEP and added in stretches today.      Plan:   Continue per POC.  Monitor response to treatment and adjust plan as needed.         Current Problem  1. Decreased range of motion (ROM) of right knee        2. Patellofemoral pain syndrome of both knees        3. Weakness of right lower extremity        4. Patellofemoral syndrome of both knees  Follow Up In Physical Therapy          Subjective   General  Reason for Referral: Patellofemoral pain syndrom  Referred By: Dr. Hauser  Precautions  Precautions  Precautions Comment: noneSTEADI Fall Risk Score (The score of 4 or more indicates an increased risk of falling): 3 (message sent to Dr. Hauser regarding PHQ2)  Precautions Comment: none  Vital Signs     Pain  Pain Score: 2  Pain Type: Acute pain  Pain Location: Knee  Pain Orientation: Right    Objective       Treatments:  Therapeutic Exercise  Therapeutic Exercise Activity 1: Sci Fit x 6 minutes leavel 1 resistance seat 9  Therapeutic Exercise Activity 2: SLR 2 x 15  Therapeutic Exercise Activity 3: SLR with hip ER 2 x 10  Therapeutic Exercise Activity 4: hooklying hip adduction 2 x 10  Therapeutic Exercise Activity 5: ITB stretch x 30 seconds x 3  Therapeutic Exercise Activity 6: hamstring stretch on mat table (long sit) x 30 seconds x 2 times each  Therapeutic Exercise Activity 7: prone quad stretch 3 x 30 seconds with strap  Therapeutic Exercise Activity 8: Bridging 2 x 10  Therapeutic Exercise Activity 9: sidelying hip abduction 2 x 10  Therapeutic Exercise Activity 10: prone hip extension 2 x  10  Therapeutic Exercise Activity 11: prone hamstring curl 2 x 10    Manual Therapy  Manual Therapy Activity 1: STM and trigger point release to R quads and foam roll to IT band      Goals:  Active       PT Problem       Patient to be able to hike x 2 miles without having to stop secondary to an increase in pain       Start:  10/24/23    Expected End:  11/21/23            Patient to demonstrate improved R LE strength to 4+/5 to allow for improved gait mechanics        Start:  10/24/23    Expected End:  11/21/23            Patient to demonstrate improved R knee ROM to 0-130 to allow for improved ability to perform daily activities        Start:  10/24/23    Expected End:  11/21/23            Patient will demonstrate independence and compliance with safe and appropriate HEP for pain reduction, ROM/flexibility, core and BLE strength and postural correction.        Start:  10/24/23    Expected End:  11/21/23            Patient will demonstrate improved score on LEFS  by 8 points to demonstrate improved subjective functional mobility and ability to perform daily activities.          Start:  10/24/23    Expected End:  11/21/23            Patient to report pain in R knee 1/10 at worst to allow for ability to return to normal activity        Start:  10/24/23    Expected End:  11/21/23

## 2023-10-31 ENCOUNTER — APPOINTMENT (OUTPATIENT)
Dept: PHYSICAL THERAPY | Facility: CLINIC | Age: 68
End: 2023-10-31
Payer: MEDICARE

## 2023-11-01 ENCOUNTER — TREATMENT (OUTPATIENT)
Dept: PHYSICAL THERAPY | Facility: CLINIC | Age: 68
End: 2023-11-01
Payer: MEDICARE

## 2023-11-01 DIAGNOSIS — M22.2X2 PATELLOFEMORAL SYNDROME OF BOTH KNEES: ICD-10-CM

## 2023-11-01 DIAGNOSIS — M22.2X1 PATELLOFEMORAL SYNDROME OF BOTH KNEES: ICD-10-CM

## 2023-11-01 PROCEDURE — 97110 THERAPEUTIC EXERCISES: CPT | Mod: GP | Performed by: PHYSICAL THERAPIST

## 2023-11-01 PROCEDURE — 97140 MANUAL THERAPY 1/> REGIONS: CPT | Mod: GP | Performed by: PHYSICAL THERAPIST

## 2023-11-01 NOTE — PROGRESS NOTES
Physical Therapy    Physical Therapy Treatment    Patient Name: Nirali Rodarte  MRN: [id number]  Today's Date: 11/1/2023  Time Calculation  Start Time: 0915  Stop Time: 1000  Time Calculation (min): 45 min      Assessment: Patient felt a little discomfort or pressure in the right knee with step-down exercise, but was tolerable.  She is doing all exercises with good form.  There are residual tight bands of muscle in right lateral quad that improved with massage.  She denied right knee pain at the end of the session.         Plan: Continue per POC, focusing on balancing medial quad strength and improving flexibility laterally for improved patellar tracking.         Current Problem  1. Patellofemoral syndrome of both knees  Follow Up In Physical Therapy          Subjective   General:  Patient reports that she is not having pain currently.  She walked 50 minutes without pain at the park yesterday without having any pain or swelling.  She purchased off -the-shelf Powerstep orthotics and is uncertain if they are appropriate.  She is doing her HEP 1-2x per day.   Reason for Referral: Patellofemoral pain syndrom  Referred By: Dr. Hauser  Precautions  Precautions  Precautions Comment: none       Pain: 0/10       Objective               Treatments:  Therapeutic Exercise  Therapeutic Exercise Activity 1: Sci Fit x 6 minutes leavel 1 resistance seat 9 (Unavailable)  Therapeutic Exercise Activity 2: SLR 2 x 15  Therapeutic Exercise Activity 3: SLR with hip ER 2 x 10 (performed at home)  Therapeutic Exercise Activity 4: hooklying hip adduction 2 x 10 (deferred)  Therapeutic Exercise Activity 5: ITB stretch x 30 seconds x 3, ok to use wall for balance  Therapeutic Exercise Activity 6: seated, hamstring stretch on mat table (long sit) x 30 seconds x 2 times each  Therapeutic Exercise Activity 7: prone quad stretch 3 x 30 seconds with strap (deferred)  Therapeutic Exercise Activity 8: Bridging 2 x 10 (deferred)  Therapeutic  "Exercise Activity 9: sidelying hip abduction x 20  Therapeutic Exercise Activity 10: prone hip extension 2 x 10  Therapeutic Exercise Activity 11: prone hamstring curl 2 x 10 (deferred)  Therapeutic Exercise Activity 12: LAQ with hip adduction 2 x 15 R/L  Therapeutic Exercise Activity 13: Step dip 4\" step bwd/fwd 2 x 10 R/L  Therapeutic Exercise Activity 14: Step up on 4\" step 2 x 10           OP EDUCATION: Instructed to use ice to right knee after walking/hiking even if no pain or swelling.  Begin with use of orthotics, progress slowly, just wearing them for a short time at first, then with increasing time.    Access Code: [email or web address]  URL: [email or web address]  Date: 11/01/2023  Prepared by: Anna Burgos    Exercises  - Sidelying Hip Abduction  - 1 x daily - 7 x weekly - 2 sets - 10 reps  - Prone Hip Extension with Plantarflexion  - 1 x daily - 7 x weekly - 2 sets - 10 reps       Goals:  Active       PT Problem       Patient to be able to hike x 2 miles without having to stop secondary to an increase in pain       Start:  10/24/23    Expected End:  11/21/23            Patient to demonstrate improved R LE strength to 4+/5 to allow for improved gait mechanics        Start:  10/24/23    Expected End:  11/21/23            Patient to demonstrate improved R knee ROM to 0-130 to allow for improved ability to perform daily activities        Start:  10/24/23    Expected End:  11/21/23            Patient will demonstrate independence and compliance with safe and appropriate HEP for pain reduction, ROM/flexibility, core and BLE strength and postural correction.        Start:  10/24/23    Expected End:  11/21/23            Patient will demonstrate improved score on LEFS  by 8 points to demonstrate improved subjective functional mobility and ability to perform daily activities.          Start:  10/24/23    Expected End:  11/21/23            Patient to report pain in R knee 1/10 at worst to allow for " ability to return to normal activity        Start:  10/24/23    Expected End:  11/21/23

## 2023-11-02 ENCOUNTER — OFFICE VISIT (OUTPATIENT)
Dept: PRIMARY CARE | Facility: CLINIC | Age: 68
End: 2023-11-02
Payer: MEDICARE

## 2023-11-02 VITALS
DIASTOLIC BLOOD PRESSURE: 69 MMHG | HEIGHT: 66 IN | SYSTOLIC BLOOD PRESSURE: 124 MMHG | WEIGHT: 163 LBS | BODY MASS INDEX: 26.2 KG/M2

## 2023-11-02 DIAGNOSIS — Z00.00 ROUTINE GENERAL MEDICAL EXAMINATION AT HEALTH CARE FACILITY: Primary | ICD-10-CM

## 2023-11-02 DIAGNOSIS — F41.9 ANXIETY: ICD-10-CM

## 2023-11-02 PROCEDURE — 1036F TOBACCO NON-USER: CPT | Performed by: INTERNAL MEDICINE

## 2023-11-02 PROCEDURE — G0439 PPPS, SUBSEQ VISIT: HCPCS | Performed by: INTERNAL MEDICINE

## 2023-11-02 PROCEDURE — 99213 OFFICE O/P EST LOW 20 MIN: CPT | Performed by: INTERNAL MEDICINE

## 2023-11-02 PROCEDURE — 1126F AMNT PAIN NOTED NONE PRSNT: CPT | Performed by: INTERNAL MEDICINE

## 2023-11-02 PROCEDURE — 1159F MED LIST DOCD IN RCRD: CPT | Performed by: INTERNAL MEDICINE

## 2023-11-02 PROCEDURE — 1170F FXNL STATUS ASSESSED: CPT | Performed by: INTERNAL MEDICINE

## 2023-11-02 PROCEDURE — 1160F RVW MEDS BY RX/DR IN RCRD: CPT | Performed by: INTERNAL MEDICINE

## 2023-11-02 RX ORDER — CITALOPRAM 10 MG/1
10 TABLET ORAL DAILY
Qty: 30 TABLET | Refills: 1 | Status: SHIPPED | OUTPATIENT
Start: 2023-11-02 | End: 2023-12-21 | Stop reason: WASHOUT

## 2023-11-02 ASSESSMENT — ACTIVITIES OF DAILY LIVING (ADL)
BATHING: INDEPENDENT
GROCERY_SHOPPING: INDEPENDENT
TAKING_MEDICATION: INDEPENDENT
MANAGING_FINANCES: INDEPENDENT
DOING_HOUSEWORK: INDEPENDENT
DRESSING: INDEPENDENT

## 2023-11-02 NOTE — PATIENT INSTRUCTIONS
"Medicare wellness    Labs reviewed and excellent    Great job with diet and exercise!    Mammogram in may 2023 normal, due in May 2024  Bone density due in 2024/after September  Colonoscopy due in 2029, normal in 2019    I recommend that you get the shingrix shots. Shingles vaccination requires a 2 shots series divided by 2 to 6 months. Get at the pharmacy. Ask the pharmacist \"how much\" prior to injected due cost varies based on your insurance. Shingrix can make you feel tired and achy for a few days after so do not get it prior to a big event. Is free now at the pharmacy    Immunizations otherwise up to date other than declines covid vaccination    Anxiety- mild range and long standing symptoms  Trial of citalopram 10mg daily. Very mild  Recommend counseling with cognitive behavioral therapy, this helps retrain your brain and often leads to no need for medication/medication wean    Left shoulder pain, likely rotator cuff tendonitis. From overuse  Do range of motion. If not better to orthopedics      Ways to Help Prevent Falls at Home    Quick Tips   ? Ask for help if you need it. Most people want to help!   ? Get up slowly after sitting or laying down   ? Wear a medical alert device or keep cell phone in your pocket   ? Use night lights, especially areas near a bathroom   ? Keep the items you use often within reach on a small stool or end table   ? Use an assistive device such as walker or cane, as directed by provider/physical therapy   ? Use a non-slip mat and grab bars in your bathroom. Look for home health sections for best options     Other Areas to Focus On   ? Exercise and nutrition: Regular exercise or taking a falls prevention class are great ways improve strength and balance. Don’t forget to stay hydrated and bring a snack!   ? Medicine side effects: Some medicines can make you sleepy or dizzy, which could cause a fall. Ask your healthcare provider about the side effects your medicines could cause. Be " sure to let them know if you take any vitamins or supplements as well.   ? Tripping hazards: Remove items you could trip on, such as loose mats, rugs, cords, and clutter. Wear closed toe shoes with rubber soles.   ? Health and wellness: Get regular checkups with your healthcare provider, plus routine vision and hearing screenings. Talk with your healthcare provider about:   o Your medicines and the possible side effects - bring them in a bag if that is easier!   o Problems with balance or feeling dizzy   o Ways to promote bone health, such as Vitamin D and calcium supplements   o Questions or concerns about falling     *Ask your healthcare team if you have questions     ©[facility], 2022

## 2023-11-02 NOTE — PROGRESS NOTES
"Subjective   Reason for Visit: Nirali Rodarte is an 68 y.o. female here for a Medicare Wellness visit.     Past Medical, Surgical, and Family History reviewed and updated in chart.    Reviewed all medications by prescribing practitioner or clinical pharmacist (such as prescriptions, OTCs, herbal therapies and supplements) and documented in the medical record.    She got covid  She has always been anxious, but has been more tearful her brother passed, he  1 year and 3 months ago  He was her only person left    She some times is afraid to go to bed at [facility] and is afraid she will not wake up. And that nobody will find her or know that she .   She gets nervous around people and thinking that they will have a negative feeling of her.  She went to PT and loves it and they offered her a job, this made her happy.  When she is around people she is comfortable  She looks at her life and she didn't do anything great.   She stopped in to counselor and they do not take her insurance.  She feels she needs to go back to work because she feels it gives her worth.    She doesn't want to get a covid shot.     Is doing PT and her knee is feeling better. She has home exercises and doing massage.    She has tried antidepressants in the past, paxil, she did not like it. Dr. Mendez, also on another medicine but doesn't remember the name of the meds.     No cp or pressure  No sob  Bowels are regular  No urinary issues, but drinks a lot of water, nocturia 1-4 times a night    She is concerned about getting shingrix due to brother  shortly after shingrix shots.                 Patient Care Team:  Rica Hauser DO as PCP - General  Rica Hauser DO as PCP - [employer] PCP     Review of Systems    Objective   Vitals:  /80   Ht 1.676 m (5' 6\")   Wt 73.9 kg (163 lb)   BMI 26.31 kg/m²       Physical Exam  Constitutional:       Appearance: Normal appearance.   Neck:      Vascular: No carotid bruit. "   Cardiovascular:      Rate and Rhythm: Normal rate and regular rhythm.      Heart sounds: Murmur (trace murmur rusb to lusb, mid systolic) heard.   Pulmonary:      Effort: Pulmonary effort is normal.   Chest:   Breasts:     Right: No inverted nipple, mass or skin change.      Left: No inverted nipple, mass or skin change.   Abdominal:      Palpations: There is no mass.      Tenderness: There is no abdominal tenderness.      Comments: No HSM   Musculoskeletal:      Right lower leg: No edema.      Left lower leg: No edema.      Comments: Shoulders with full rom  Tender without weakness with resisted abduction/int rotation left and with ext rotation in adduction left   Lymphadenopathy:      Cervical: No cervical adenopathy.      Upper Body:      Right upper body: No supraclavicular or axillary adenopathy.      Left upper body: No supraclavicular or axillary adenopathy.   Skin:     Findings: Rash (excoriation left posterior shoulder) present. No bruising, erythema or lesion.   Neurological:      Mental Status: She is alert and oriented to person, place, and time.   Psychiatric:         Behavior: Behavior normal.         Thought Content: Thought content normal.      Comments: PHQ9 is 4  GAD7 is 9         Assessment/Plan   Problem List Items Addressed This Visit    None  Visit Diagnoses       Routine general medical examination at health care facility    -  Primary

## 2023-11-02 NOTE — PROGRESS NOTES
"Subjective   Patient ID: Nirali Rodarte is a 68 y.o. female who presents for Medicare Annual Wellness Visit Subsequent.    HPI     Review of Systems    Objective   /80   Ht 1.676 m (5' 6\")   Wt 73.9 kg (163 lb)   BMI 26.31 kg/m²     Physical Exam    Assessment/Plan          Patient was identified as a fall risk. Risk prevention instructions provided.  "

## 2023-11-03 ENCOUNTER — TREATMENT (OUTPATIENT)
Dept: PHYSICAL THERAPY | Facility: CLINIC | Age: 68
End: 2023-11-03
Payer: MEDICARE

## 2023-11-03 DIAGNOSIS — M22.2X1 PATELLOFEMORAL SYNDROME OF BOTH KNEES: ICD-10-CM

## 2023-11-03 DIAGNOSIS — M22.2X2 PATELLOFEMORAL SYNDROME OF BOTH KNEES: ICD-10-CM

## 2023-11-03 PROCEDURE — 97110 THERAPEUTIC EXERCISES: CPT | Mod: GP | Performed by: PHYSICAL THERAPIST

## 2023-11-03 PROCEDURE — 97140 MANUAL THERAPY 1/> REGIONS: CPT | Mod: GP | Performed by: PHYSICAL THERAPIST

## 2023-11-03 NOTE — PROGRESS NOTES
"Physical Therapy    Physical Therapy Treatment    Patient Name: Nirali Rodarte  MRN: [id number]  Today's Date: 11/3/2023  Time Calculation  Start Time: 1327  Stop Time: 1415  Time Calculation (min): 48 min      Assessment: Patient is doing very well with current program, with no complaints of pain and improving mobility and ability to participate in recreational activities. She demonstrates lack of eccentric control and some discomfort in right knee with step dips, but pain does not persist after the exercise.  She will benefit from continued therapy to assist with returning fully to PLOF.        Plan: Continue with POC. Attempt mini squats, progress to 6\" step with step ups, continue with 4\" step with eccentric work.        Current Problem  1. Patellofemoral syndrome of both knees  Follow Up In Physical Therapy          Subjective   General Patient has no new complaints.  She went for a walk yesterday on a flatter surface.  She had no pain during or afterward.  She is doing her exercise classes, but avoiding any squats or lunges.  She has not yet tried the orthotic yet.  Reason for Referral: Patellofemoral pain syndrom  Referred By: Dr. Hauser  Precautions  Precautions  Precautions Comment: noneSTEADI: 3     Pain:0-10 Right knee       Objective           Treatments:  Therapeutic Exercise  Therapeutic Exercise Activity 1: Sci Fit x 6 minutes leavel 1 resistance seat 9 (Unavailable)  Therapeutic Exercise Activity 2: SLR 2 x 10, 1#  Therapeutic Exercise Activity 3: SLR with hip ER 2 x 10, 1#  Therapeutic Exercise Activity 4: hooklying hip adduction 2 x 10  Therapeutic Exercise Activity 5: ITB stretch x 30 seconds x 3, ok to use wall for balance (deferred)  Therapeutic Exercise Activity 6: seated, hamstring stretch on mat table (long sit) self directed between exercises  Therapeutic Exercise Activity 7: prone quad stretch 3 x 30 seconds with strap)  Therapeutic Exercise Activity 8: Bridging 2 x 10 " "(deferred)  Therapeutic Exercise Activity 9: sidelying hip abduction x 20  Therapeutic Exercise Activity 10: prone hip extension 2 x 10  Therapeutic Exercise Activity 11: standing hamstring curl 2 x 10, 1#  Therapeutic Exercise Activity 12: LAQ with hip adduction 2 x 15 R/L, 1#  Therapeutic Exercise Activity 13: Step dip 4\" step bwd/fwd 2 x 10 R/L  Therapeutic Exercise Activity 14: Step up on 4\" step with opposite hip flexion 2 x 10  Therapeutic Exercise Activity 15: Standing HS curl 2 x 10, 1#  Therapeutic Exercise Activity 16: SLow march and hold x 2 laps.      OP EDUCATION:  Access Code: [email or web address]  URL: [email or web address]  Date: 11/03/2023  Prepared by: Anna Burgos    Exercises  - Prone Quadriceps Stretch with Strap  - 1 x daily - 7 x weekly - 1 sets - 3 reps - 30 seconds hold       Goals:  Active       PT Problem       Patient to be able to hike x 2 miles without having to stop secondary to an increase in pain       Start:  10/24/23    Expected End:  11/21/23            Patient to demonstrate improved R LE strength to 4+/5 to allow for improved gait mechanics        Start:  10/24/23    Expected End:  11/21/23            Patient to demonstrate improved R knee ROM to 0-130 to allow for improved ability to perform daily activities        Start:  10/24/23    Expected End:  11/21/23            Patient will demonstrate independence and compliance with safe and appropriate HEP for pain reduction, ROM/flexibility, core and BLE strength and postural correction.        Start:  10/24/23    Expected End:  11/21/23            Patient will demonstrate improved score on LEFS  by 8 points to demonstrate improved subjective functional mobility and ability to perform daily activities.          Start:  10/24/23    Expected End:  11/21/23            Patient to report pain in R knee 1/10 at worst to allow for ability to return to normal activity        Start:  10/24/23    Expected End:  11/21/23          "

## 2023-11-10 ENCOUNTER — APPOINTMENT (OUTPATIENT)
Dept: PHYSICAL THERAPY | Facility: CLINIC | Age: 68
End: 2023-11-10
Payer: MEDICARE

## 2023-11-13 ENCOUNTER — TREATMENT (OUTPATIENT)
Dept: PHYSICAL THERAPY | Facility: CLINIC | Age: 68
End: 2023-11-13
Payer: MEDICARE

## 2023-11-13 DIAGNOSIS — M22.2X1 PATELLOFEMORAL PAIN SYNDROME OF BOTH KNEES: ICD-10-CM

## 2023-11-13 DIAGNOSIS — M22.2X1 PATELLOFEMORAL SYNDROME OF BOTH KNEES: ICD-10-CM

## 2023-11-13 DIAGNOSIS — M25.661 DECREASED RANGE OF MOTION (ROM) OF RIGHT KNEE: Primary | ICD-10-CM

## 2023-11-13 DIAGNOSIS — M22.2X2 PATELLOFEMORAL PAIN SYNDROME OF BOTH KNEES: ICD-10-CM

## 2023-11-13 DIAGNOSIS — R29.898 WEAKNESS OF RIGHT LOWER EXTREMITY: ICD-10-CM

## 2023-11-13 DIAGNOSIS — M22.2X2 PATELLOFEMORAL SYNDROME OF BOTH KNEES: ICD-10-CM

## 2023-11-13 PROCEDURE — 97110 THERAPEUTIC EXERCISES: CPT | Mod: GP | Performed by: PHYSICAL THERAPIST

## 2023-11-13 NOTE — PROGRESS NOTES
Physical Therapy    Physical Therapy Treatment    Patient Name: Nirali Rodarte  MRN: [id number]  Today's Date: 11/13/2023  Time Calculation  Start Time: 1458  Stop Time: 1543  Time Calculation (min): 45 min      Assessment:   Patient did not have any reports of pain during session today.  PT was able to add 2# today and progress exercises without significant difficulty.   Standing mini wall caused significant crepitus and mild pain so exercise was stopped.  Plan to continue to strengthen as tolerated.      Plan:   Continue per POC.  Monitor response to treatment and adjust plan as needed.         Current Problem  1. Decreased range of motion (ROM) of right knee        2. Patellofemoral pain syndrome of both knees        3. Weakness of right lower extremity        4. Patellofemoral syndrome of both knees  Follow Up In Physical Therapy          Subjective   General  Reason for Referral: Patellofemoral pain syndrom  Referred By: Dr. HauserPatient reported she walked up and down hills over the weekend and she did have some discomfort but today she has minimal pain.  She is doing HEP at home and she feels her strength and ROM is improving.    Precautions   none    Pain   No pain     Objective     Therapeutic Exercise  Therapeutic Exercise Activity 1: Sci Fit x 6 minutes level 2 resistance seat 9  Therapeutic Exercise Activity 2: SLR 2 x 10, 2#  Therapeutic Exercise Activity 3: SLR with hip ER 2 x 10, 2#  Therapeutic Exercise Activity 6: seated, hamstring stretch on mat table (long sit) self directed between exercises  Therapeutic Exercise Activity 7: lateral step ups onto 4 inch step  Therapeutic Exercise Activity 8: Bridging 2 x 10 (deferred)  Therapeutic Exercise Activity 9: sidelying hip abduction x 20 2 x 10  Therapeutic Exercise Activity 10: prone hip extension 2 x 10 2 #  Therapeutic Exercise Activity 11: standing hamstring curl 2 x 10, 2#  Therapeutic Exercise Activity 12: LAQ with hip adduction 2 x 15 R/L,  "2#  Therapeutic Exercise Activity 13: Step dip 4\" step bwd/fwd 2 x 10 R/L  Therapeutic Exercise Activity 14: Step up on 4\" step with opposite hip flexion 2 x 10  Therapeutic Exercise Activity 15: standing sidestepping with purple band x 4 laps  Therapeutic Exercise Activity 16: Slow march and hold x 4 laps with 2# B LE  Therapeutic Exercise Activity 17: mini wall slides x 5  Therapeutic Exercise Activity 18: heel raises x 20    OP EDUCATION:   Educated to continue with HEP     Goals:  Active       PT Problem       Patient to be able to hike x 2 miles without having to stop secondary to an increase in pain       Start:  10/24/23    Expected End:  11/21/23            Patient to demonstrate improved R LE strength to 4+/5 to allow for improved gait mechanics        Start:  10/24/23    Expected End:  11/21/23            Patient to demonstrate improved R knee ROM to 0-130 to allow for improved ability to perform daily activities        Start:  10/24/23    Expected End:  11/21/23            Patient will demonstrate independence and compliance with safe and appropriate HEP for pain reduction, ROM/flexibility, core and BLE strength and postural correction.        Start:  10/24/23    Expected End:  11/21/23            Patient will demonstrate improved score on LEFS  by 8 points to demonstrate improved subjective functional mobility and ability to perform daily activities.          Start:  10/24/23    Expected End:  11/21/23            Patient to report pain in R knee 1/10 at worst to allow for ability to return to normal activity        Start:  10/24/23    Expected End:  11/21/23               "

## 2023-11-15 ENCOUNTER — TREATMENT (OUTPATIENT)
Dept: PHYSICAL THERAPY | Facility: CLINIC | Age: 68
End: 2023-11-15
Payer: MEDICARE

## 2023-11-15 DIAGNOSIS — M22.2X2 PATELLOFEMORAL SYNDROME OF BOTH KNEES: ICD-10-CM

## 2023-11-15 DIAGNOSIS — M25.661 DECREASED RANGE OF MOTION (ROM) OF RIGHT KNEE: Primary | ICD-10-CM

## 2023-11-15 DIAGNOSIS — M22.2X1 PATELLOFEMORAL PAIN SYNDROME OF RIGHT KNEE: ICD-10-CM

## 2023-11-15 DIAGNOSIS — R29.898 WEAKNESS OF RIGHT LOWER EXTREMITY: ICD-10-CM

## 2023-11-15 DIAGNOSIS — M22.2X1 PATELLOFEMORAL SYNDROME OF BOTH KNEES: ICD-10-CM

## 2023-11-15 PROCEDURE — 97110 THERAPEUTIC EXERCISES: CPT | Mod: GP | Performed by: PHYSICAL THERAPIST

## 2023-11-15 NOTE — PROGRESS NOTES
"Physical Therapy    Physical Therapy Treatment    Patient Name: Nirali Rodarte  MRN: [id number]  Today's Date: 11/17/2023  Time Calculation  Start Time: 1350  Stop Time: 1440  Time Calculation (min): 50 min      Assessment: Patient demonstrated improved eccentric control without pain on 4\" step, so advanced her to 6\" step and added to HEP to assist with down hill walking and descending stairs.  She appears to be progressing very well, returning to previous activities with minimal to no right knee pain.  She would continue to benefit from PT to improve eccentric control and strength to optimize stair negotiation and downhill walking.        Plan: Assess effectiveness of tape in reducing pain with downhill walking, trial other technique if indicated/patient willing.        Current Problem  1. Decreased range of motion (ROM) of right knee        2. Patellofemoral syndrome of both knees  Follow Up In Physical Therapy      3. Weakness of right lower extremity        4. Patellofemoral pain syndrome of right knee            Subjective   General: Patient reports now being able to do a free squat in [employer] class with no knee pain.  Did not like doing the wall slide last session.  Still has some pain/discomfort in anterior right knee when walking downhill. She is still taking the stairs in a step-to fashion. Is still icing knee.  She purchased a different pair of orthotics and tried them for a short time as instructed and liked the feeling of support.    Reason for Referral: Patellofemoral pain syndrom  Referred By: Dr. Hauser  Precautions: STEADi score 4  Precautions  Precautions Comment: none       Pain: 0/10       Objective             Treatments:  Therapeutic Exercise  Therapeutic Exercise Activity 1: Sci Fit x 6 minutes level 2 resistance seat 9  Therapeutic Exercise Activity 2: SLR 2 x 10, 2#  Therapeutic Exercise Activity 3: SLR with hip ER 2 x 10, 2#  Therapeutic Exercise Activity 6: seated, hamstring stretch on mat " "table (long sit) self directed between exercises  Therapeutic Exercise Activity 7: lateral step ups onto 6 inch step x 20 R/L  Therapeutic Exercise Activity 8: Bridging 2 x 10 (deferred)  Therapeutic Exercise Activity 9: sidelying hip abduction x 20 2 x 10 (deferred)  Therapeutic Exercise Activity 10: prone hip extension 2 x 10 2 # (deferred)  Therapeutic Exercise Activity 11: standing hamstring curl 2 x 10, 2#  Therapeutic Exercise Activity 12: LAQ with hip adduction 2 x 15 R/L, 2#  Therapeutic Exercise Activity 13: Step dip 4\" step bwd/fwd  x 10 R/L  Therapeutic Exercise Activity 14: Step up on 6\" step with opposite hip flexion 2 x 10  Therapeutic Exercise Activity 15: standing sidestepping with purple band x 4 laps (deferred)  Therapeutic Exercise Activity 16: Slow march and hold x 4 laps with 2# B LE  Therapeutic Exercise Activity 17: mini wall slides x 5 (deferred)  Therapeutic Exercise Activity 18: heel raises x 20  Therapeutic Exercise Activity 19: step dip 6\" x 10 R/L  Therapeutic Exercise Activity 20: SLS on R/L 2 x 30\"    Manual Therapy  Manual Therapy Performed: Yes  Manual Therapy Activity 1: Therapeutic taping 2, I-strips were applied to right knee to surround the patella.  1st strip was anchored proximally at medial quads, pulled laterally around patella and anchored at inferior medial knee.  The 2nd strip was anchored proximally at lateral quads, pulled medially around patella and anchored at inferior lateral knee.       OP EDUCATION: The patient was educated on the risks and benefits of therapeutic taping.  She denied allergy to adhesives and consented to placement of the tape.  She was educated on 3-day wear schedule, ability to shower with the tape on.  She was instructed to remove the tape immediately if there is any skin reaction including itching or irritation, or if the tape is ineffective, or beginning to peel.  She was advised to removed the tape slowly in a rolling fashion and to soak the " tape with oil to ease removal if there tape becomes adhered.  She verbally expressed understanding.     Access Code: [email or web address]  URL: [email or web address]  Date: 11/15/2023  Prepared by: Anna Burgos    Exercises  - Forward Step Down  - 1 x daily - 7 x weekly - 2 sets - 10 reps       Goals:  Active       PT Problem       Patient to be able to hike x 2 miles without having to stop secondary to an increase in pain       Start:  10/24/23    Expected End:  11/21/23            Patient to demonstrate improved R LE strength to 4+/5 to allow for improved gait mechanics        Start:  10/24/23    Expected End:  11/21/23            Patient to demonstrate improved R knee ROM to 0-130 to allow for improved ability to perform daily activities        Start:  10/24/23    Expected End:  11/21/23            Patient will demonstrate independence and compliance with safe and appropriate HEP for pain reduction, ROM/flexibility, core and BLE strength and postural correction.        Start:  10/24/23    Expected End:  11/21/23            Patient will demonstrate improved score on LEFS  by 8 points to demonstrate improved subjective functional mobility and ability to perform daily activities.          Start:  10/24/23    Expected End:  11/21/23            Patient to report pain in R knee 1/10 at worst to allow for ability to return to normal activity        Start:  10/24/23    Expected End:  11/21/23

## 2023-11-21 ENCOUNTER — TREATMENT (OUTPATIENT)
Dept: PHYSICAL THERAPY | Facility: CLINIC | Age: 68
End: 2023-11-21
Payer: MEDICARE

## 2023-11-21 DIAGNOSIS — R29.898 WEAKNESS OF RIGHT LOWER EXTREMITY: ICD-10-CM

## 2023-11-21 DIAGNOSIS — M22.2X9 PATELLOFEMORAL STRESS SYNDROME, UNSPECIFIED LATERALITY: ICD-10-CM

## 2023-11-21 DIAGNOSIS — M22.2X1 PATELLOFEMORAL SYNDROME OF BOTH KNEES: ICD-10-CM

## 2023-11-21 DIAGNOSIS — M22.2X2 PATELLOFEMORAL SYNDROME OF BOTH KNEES: ICD-10-CM

## 2023-11-21 DIAGNOSIS — M25.661 DECREASED RANGE OF MOTION (ROM) OF RIGHT KNEE: Primary | ICD-10-CM

## 2023-11-21 PROCEDURE — 97110 THERAPEUTIC EXERCISES: CPT | Mod: GP | Performed by: PHYSICAL THERAPIST

## 2023-11-21 NOTE — PROGRESS NOTES
"Physical Therapy    Physical Therapy Treatment    Patient Name: Nirali Rodarte  MRN: [id number]  Today's Date: 11/21/2023  Time Calculation  Start Time: 1345  Stop Time: 1430  Time Calculation (min): 45 min      Assessment:   Patient is improving R knee flexion ROM to 135 from 95 at eval. Patient has most difficulty with eccentric control of R knee.   Plan to make comprehensive HEP for next session for discharge.  Plan:   Continue per POC.  Monitor response to treatment and adjust plan as needed.   - Recheck next session and patient feels she would be comfortable with discharge.       Current Problem  1. Decreased range of motion (ROM) of right knee        2. Patellofemoral stress syndrome, unspecified laterality        3. Weakness of right lower extremity        4. Patellofemoral syndrome of both knees  Follow Up In Physical Therapy          General  PT  Visit  PT Received On: 11/21/23Patient feels \"twinges of pain\" intermittently.  No pain at rest.  She did not feel much difference with taping.    General  Reason for Referral: Patellofemoral pain syndrom  Referred By: Dr. Hauser    Subjective    Precautions  Precautions  Precautions Comment: noneFall risk     Pain   No pain    Objective     Therapeutic Exercise  Therapeutic Exercise Activity 1: Sci Fit x 6 minutes level 2 resistance seat 9  Therapeutic Exercise Activity 2: SLR 2 x 10, 2#  Therapeutic Exercise Activity 3: SLR with hip ER 2 x 10, 2#  Therapeutic Exercise Activity 4: prone knee flexion stretch 3 x 30 seconds  Therapeutic Exercise Activity 5: walking up and down ramp (backwards) x 10  Therapeutic Exercise Activity 6: seated, hamstring stretch on mat table (long sit) self directed between exercises  Therapeutic Exercise Activity 7: lateral step ups onto 6 inch step x 20 R/L  Therapeutic Exercise Activity 8: sit to stand with L LE into front 2 x 10  Therapeutic Exercise Activity 9: step ups with opposite hip flexion 2 x 10 no UE support  Therapeutic " Exercise Activity 10: heel raises x 20    OP EDUCATION:   Educated to continue with HEP     Goals:  Active       PT Problem       Patient to be able to hike x 2 miles without having to stop secondary to an increase in pain       Start:  10/24/23    Expected End:  11/21/23            Patient to demonstrate improved R LE strength to 4+/5 to allow for improved gait mechanics        Start:  10/24/23    Expected End:  11/21/23            Patient to demonstrate improved R knee ROM to 0-130 to allow for improved ability to perform daily activities        Start:  10/24/23    Expected End:  11/21/23            Patient will demonstrate independence and compliance with safe and appropriate HEP for pain reduction, ROM/flexibility, core and BLE strength and postural correction.        Start:  10/24/23    Expected End:  11/21/23            Patient will demonstrate improved score on LEFS  by 8 points to demonstrate improved subjective functional mobility and ability to perform daily activities.          Start:  10/24/23    Expected End:  11/21/23            Patient to report pain in R knee 1/10 at worst to allow for ability to return to normal activity        Start:  10/24/23    Expected End:  11/21/23

## 2023-11-24 ENCOUNTER — TREATMENT (OUTPATIENT)
Dept: PHYSICAL THERAPY | Facility: CLINIC | Age: 68
End: 2023-11-24
Payer: MEDICARE

## 2023-11-24 DIAGNOSIS — R29.898 WEAKNESS OF RIGHT LOWER EXTREMITY: ICD-10-CM

## 2023-11-24 DIAGNOSIS — M25.661 DECREASED RANGE OF MOTION (ROM) OF RIGHT KNEE: Primary | ICD-10-CM

## 2023-11-24 DIAGNOSIS — M22.2X9 PATELLOFEMORAL STRESS SYNDROME, UNSPECIFIED LATERALITY: ICD-10-CM

## 2023-11-24 DIAGNOSIS — M22.2X2 PATELLOFEMORAL SYNDROME OF BOTH KNEES: ICD-10-CM

## 2023-11-24 DIAGNOSIS — M22.2X1 PATELLOFEMORAL SYNDROME OF BOTH KNEES: ICD-10-CM

## 2023-11-24 PROCEDURE — 97530 THERAPEUTIC ACTIVITIES: CPT | Mod: GP | Performed by: PHYSICAL THERAPIST

## 2023-11-24 PROCEDURE — 97110 THERAPEUTIC EXERCISES: CPT | Mod: GP | Performed by: PHYSICAL THERAPIST

## 2023-11-24 NOTE — PROGRESS NOTES
Physical Therapy    Physical Therapy Treatment/recheck/d/c    Patient Name: Nirali Rodarte  MRN: [id number]  Today's Date: 11/24/2023  Time Calculation  Start Time: 1415  Stop Time: 1456  Time Calculation (min): 41 min      Assessment:   Patient is a 68 year old who has had 8 sessions of Physical Therapy with focus on addressing knee pain.  Currently patient has demonstrated progress with the following:  improved knee ROM (R knee flexion improved from  - mild pain at end range, improved B LE strength, and improved gait quality. Patient continues to have difficulty with the following mild weakness in R knee with most difficulty with eccentric control when descending stairs and edema in inferior knee.  Patient feels comfortable with discharge today and plans to continue with HEP.  Patient was provided with comprehensive HEP of important exercises to continue and was educated if pain increases to reach out to physician.      Plan:   Discharge from PT today - patient agrees with plan.     Current Problem  1. Decreased range of motion (ROM) of right knee        2. Patellofemoral stress syndrome, unspecified laterality        3. Weakness of right lower extremity        4. Patellofemoral syndrome of both knees  Follow Up In Physical Therapy          General  PT  Visit  PT Received On: 11/24/23Patient reported she walked up and down hills yesterday and she did not have any pain.    General  Reason for Referral: Patellofemoral pain syndrom  Referred By: Dr. HauserPatient reported she does feel like she is improving.      Subjective    Precautions  Precautions  Precautions Comment: noneNone     Pain   0/10 at rest     Objective     Functional Assessments:  Gait Comment: equal weight bearingR LE, genu valgum  , Stairs Comment: Reciprical pattern but  poor R knee control when leading with L  , Transfers Comment: Independent  , and Comment: Edema noted along R inferior patella and L medial patella.       Lumbar AROM WFL  unless documented below     Hip AROM WFL unless documented below     Hip PROM WFL unless documented below     Specific Lower Extremity   R Iliopsoas: (5/5): 4+5  L Iliopsoas: (5/5): 5  R Gluteals (prone): (5/5): 4  L Gluteals (prone): (5/5): 4  R Gluteals (sidelying): (5/5): 4  L Gluteals (sidelying): (5/5): 4+  R knee flexion: (5/5): 4+  L knee flexion: (5/5): 5  R knee extension: (5/5): 5  L knee extension: (5/5): 5      Flexibility  R hamstrings: WNL  L hamstrings: WNL  R hip flexors: WNL  L hip flexors: WNL   Knee AROM   R knee flexion: (140°): 128 mild pain at end range  L knee flexion: (140°): 140  R knee extension: (0°): 0  L knee extension: (0°): 0    LEFS 63     Treatments:  Therapeutic Exercise  Therapeutic Exercise Activity 1: Sci Fit x 6 minutes level 2 resistance seat 9  Sit to stand x 10 with L LE in front   Reviewed remainder of exercises with patient 6    OP EDUCATION:   Patient was provided HEP to continue at home [employer] access code [email or web address]    Goals:  Resolved       PT Problem       Patient to be able to hike x 2 miles without having to stop secondary to an increase in pain (Met)       Start:  10/24/23    Expected End:  11/21/23    Resolved:  11/24/23         Patient to demonstrate improved R LE strength to 4+/5 to allow for improved gait mechanics  (Adequate for Discharge)       Start:  10/24/23    Expected End:  11/21/23         Goal Note       Some muscles 4/5 - see objective               Patient to demonstrate improved R knee ROM to 0-130 to allow for improved ability to perform daily activities  (Adequate for Discharge)       Start:  10/24/23    Expected End:  11/21/23         Goal Note       0-128              Patient will demonstrate independence and compliance with safe and appropriate HEP for pain reduction, ROM/flexibility, core and BLE strength and postural correction.  (Met)       Start:  10/24/23    Expected End:  11/21/23    Resolved:  11/24/23         Patient will demonstrate  improved score on LEFS  by 8 points to demonstrate improved subjective functional mobility and ability to perform daily activities.    (Met)       Start:  10/24/23    Expected End:  11/21/23    Resolved:  11/24/23         Patient to report pain in R knee 1/10 at worst to allow for ability to return to normal activity  (Met)       Start:  10/24/23    Expected End:  11/21/23    Resolved:  11/24/23

## 2023-12-14 ENCOUNTER — APPOINTMENT (OUTPATIENT)
Dept: PRIMARY CARE | Facility: CLINIC | Age: 68
End: 2023-12-14
Payer: MEDICARE

## 2023-12-21 ENCOUNTER — OFFICE VISIT (OUTPATIENT)
Dept: OBSTETRICS AND GYNECOLOGY | Facility: CLINIC | Age: 68
End: 2023-12-21
Payer: MEDICARE

## 2023-12-21 VITALS
SYSTOLIC BLOOD PRESSURE: 120 MMHG | WEIGHT: 163 LBS | DIASTOLIC BLOOD PRESSURE: 80 MMHG | BODY MASS INDEX: 26.2 KG/M2 | HEIGHT: 66 IN

## 2023-12-21 DIAGNOSIS — Z12.31 ENCOUNTER FOR SCREENING MAMMOGRAM FOR BREAST CANCER: ICD-10-CM

## 2023-12-21 DIAGNOSIS — R87.810 CERVICAL HIGH RISK HPV (HUMAN PAPILLOMAVIRUS) TEST POSITIVE: ICD-10-CM

## 2023-12-21 DIAGNOSIS — Z12.4 SCREENING FOR CERVICAL CANCER: ICD-10-CM

## 2023-12-21 DIAGNOSIS — Z01.419 ENCOUNTER FOR WELL WOMAN EXAM WITH ROUTINE GYNECOLOGICAL EXAM: Primary | ICD-10-CM

## 2023-12-21 PROCEDURE — 1036F TOBACCO NON-USER: CPT | Performed by: OBSTETRICS & GYNECOLOGY

## 2023-12-21 PROCEDURE — 1126F AMNT PAIN NOTED NONE PRSNT: CPT | Performed by: OBSTETRICS & GYNECOLOGY

## 2023-12-21 PROCEDURE — 1159F MED LIST DOCD IN RCRD: CPT | Performed by: OBSTETRICS & GYNECOLOGY

## 2023-12-21 PROCEDURE — 87624 HPV HI-RISK TYP POOLED RSLT: CPT

## 2023-12-21 PROCEDURE — 99397 PER PM REEVAL EST PAT 65+ YR: CPT | Performed by: OBSTETRICS & GYNECOLOGY

## 2023-12-21 PROCEDURE — 88175 CYTOPATH C/V AUTO FLUID REDO: CPT

## 2023-12-21 PROCEDURE — 1160F RVW MEDS BY RX/DR IN RCRD: CPT | Performed by: OBSTETRICS & GYNECOLOGY

## 2023-12-21 RX ORDER — ACETAMINOPHEN 500 MG
TABLET ORAL DAILY
COMMUNITY

## 2023-12-21 NOTE — PROGRESS NOTES
PAP 22 NEG HPV POS 16. Colpo 12- nl ECC  PAP - NEG HPV NEG, 10-13-20 NEG HPV POS, COLPO 10-30-20 NEG, 10-11-19 NEG HPV POS, COLPO 10-25-19 NEG  MAMMO 23  DEXA 22 T=-2.5  COLON 19     ASSESSMENT/PLAN  1. Encounter for well woman exam with routine gynecological exam  2. Cervical high risk HPV (human papillomavirus) test positive  Routine well woman visit.   Your exam was normal today.   A pap and HPV test was done. If you are connected with the  on line system, you will be notified about your pap results through the patient portal.   3. Encounter for screening mammogram for breast cancer  - BI mammo bilateral screening tomosynthesis; Due 2024.     SUBJECTIVE    HPI    69 yo for routine well woman visit.   Her last visit was 2022.   h/o abnormal paps as above. Last pap 2022 was normal, HPV positive.  Had colpo, normal.  Up to date with PCP visits.   Saw Dr. Null last year after found to have fatty liver. Followup 2023 normal labs. To followup in April. Not on any meds.  Asks me to look at toe which is swollen, purple red, tender. Denies trauma.   Single, no smoke, no ETOH.   Pts 18 year old cat just . Pt sad about that.      Review of Systems    Constitutional: no fever, no chills, no recent weight gain, no recent weight loss and no fatigue.   Eyes: no eye pain, no vision problems and no dryness of the eyes.   ENT: no hearing loss, no nosebleeds and no sinus congestion.   Cardiovascular: no chest pain, no palpitations and no orthopnea.   Respiratory: no shortness of breath, no cough and no wheezing.   Gastrointestinal: no abdominal pain, no constipation, no nausea, no diarrhea and no vomiting.   Genitourinary: no dysuria, no urinary incontinence, no vaginal dryness, no vaginal itching, no dyspareunia, no pelvic pain, no dysmenorrhea, no sexual problems, no change in urinary frequency, no vaginal discharge, no unexplained vaginal bleeding and no lesion/sore.  "  Musculoskeletal: no back pain, no joint swelling and no leg edema.   Integumentary: no rashes, no skin lesions, no nipple discharge, no breast pain and no breast lump.   Neurological: no headache, no numbness and no dizziness.   Psychiatric: + sleep disturbances, anxiety and depression.   Endocrine: no hot flashes, no loss of hair and no hirsutism.   Hematologic/Lymphatic: no swollen glands, no tendency for easy bleeding and no tendency for easy bruising.   All other systems have been reviewed and are negative for complaint.     OBJECTIVE    /80   Ht 1.676 m (5' 6\")   Wt 73.9 kg (163 lb)   BMI 26.31 kg/m²      Physical Exam     Constitutional: Alert and in no acute distress. Well developed, well nourished   Head and Face: Head and face: normal   Eyes: Normal external exam - nonicteric sclera, extraocular movements intact (EOMI) and no ptosis.   Ears, Nose, Mouth, and Throat: External inspection of ears and nose: normal   Neck: no neck asymmetry. Supple and thyroid not enlarged and there were no palpable thyroid nodules   Cardiovascular: Heart rate and rhythm were normal, normal S1 and S2, no gallops, and no murmurs   Pulmonary: No respiratory distress and clear bilateral breath sounds   Chest: Breasts: normal appearance, no nipple discharge and no skin changes and palpation of breasts and axillae: no palpable mass and no axillary lymphadenopathy   Abdomen: soft nontender; no abdominal mass palpated, no organomegaly and no hernias   Genitourinary: external genitalia: normal, no inguinal lymphadenopathy, Bartholin's urethral and [place]'s glands: normal, urethra: normal, bladder: normal on palpation and perianal area: normal   Vagina: normal.   Cervix: Normal appearing without lesions.  Uterus: Normal, mobile, nontender.  Right Adnexa/parametria: Normal.   Left Adnexa/parametria: Normal.   Skin: normal skin color and pigmentation, normal skin turgor, and no rash.   Small toe right foot ecchymotic appearing, " tender to palpation. Does not appear like an infection.  Psychiatric: alert and oriented x 3., affect normal to patient baseline and mood: appropriate        Sarahi Butcher MD

## 2024-01-09 LAB
CYTOLOGY CMNT CVX/VAG CYTO-IMP: NORMAL
HPV HR 12 DNA GENITAL QL NAA+PROBE: NEGATIVE
HPV HR GENOTYPES PNL CVX NAA+PROBE: POSITIVE
HPV16 DNA SPEC QL NAA+PROBE: POSITIVE
HPV18 DNA SPEC QL NAA+PROBE: NEGATIVE
LAB AP HPV GENOTYPE QUESTION: YES
LAB AP HPV HR: NORMAL
LAB AP PREVIOUS ABNORMAL HISTORY: NORMAL
LABORATORY COMMENT REPORT: NORMAL
MENSTRUAL HX REPORTED: NORMAL
PATH REPORT.TOTAL CANCER: NORMAL

## 2024-01-18 ENCOUNTER — PROCEDURE VISIT (OUTPATIENT)
Dept: OBSTETRICS AND GYNECOLOGY | Facility: CLINIC | Age: 69
End: 2024-01-18
Payer: MEDICARE

## 2024-01-18 VITALS
HEIGHT: 66 IN | DIASTOLIC BLOOD PRESSURE: 80 MMHG | BODY MASS INDEX: 26.2 KG/M2 | WEIGHT: 163 LBS | SYSTOLIC BLOOD PRESSURE: 122 MMHG

## 2024-01-18 DIAGNOSIS — R87.810 CERVICAL HIGH RISK HPV (HUMAN PAPILLOMAVIRUS) TEST POSITIVE: Primary | ICD-10-CM

## 2024-01-18 PROCEDURE — 88305 TISSUE EXAM BY PATHOLOGIST: CPT | Performed by: STUDENT IN AN ORGANIZED HEALTH CARE EDUCATION/TRAINING PROGRAM

## 2024-01-18 PROCEDURE — 88305 TISSUE EXAM BY PATHOLOGIST: CPT

## 2024-01-18 PROCEDURE — 88342 IMHCHEM/IMCYTCHM 1ST ANTB: CPT

## 2024-01-18 PROCEDURE — 57456 ENDOCERV CURETTAGE W/SCOPE: CPT | Performed by: OBSTETRICS & GYNECOLOGY

## 2024-01-18 PROCEDURE — 88342 IMHCHEM/IMCYTCHM 1ST ANTB: CPT | Performed by: STUDENT IN AN ORGANIZED HEALTH CARE EDUCATION/TRAINING PROGRAM

## 2024-01-18 NOTE — PROGRESS NOTES
Imp/Plan    Normal pap, HPV+ 16  Normal colposcopy.  Endocervical curettage done.   Results by phone.      Subjective  69 yo for colposcopy.  Normal pap, HPV+ 16.     Colposcopy    Date/Time: 1/18/2024 1:58 PM    Performed by: Sarahi Butcher MD  Authorized by: Sarahi Butcher MD    Consent:     Patient questions answered: yes      Risks and benefits of the procedure and its alternatives discussed: yes      Procedural risks discussed:  Bleeding, infection and possible continued pain    Consent obtained:  Written    Consent given by:  Patient  Pre-procedure:     Prep solution(s): acetic acid    Procedure:     Colposcopy with: endocervical curettage      Colposcopy details:  Normal appearing ectocervix without AW changes.   Tight cervical os.   Single tooth tenaculum placed on post lip of cervix to obtain endocervical curettage.    Cervix visibility: fully visualized      SCJ visibility: not fully visualized    Post-procedure:     Patient tolerance of procedure:  Patient tolerated the procedure well with no immediate complications

## 2024-01-18 NOTE — PATIENT INSTRUCTIONS
Visit for Colposcopy:  You were seen today for colposcopy for abnormal pap smear  Colposcopy is a type of examination that allows for better visualization of abnormal cervical cells, generally caused by infection with Human Papilloma Virus (HPV)  You may have had a biopsy done to better diagnose cervical abnormalities and plan for future management. If so, you may have some spotting to light vaginal bleeding for a few days. Sometimes a medication called Monsel's is used after cervical biopsies to stop bleeding, and this medication often causes a discharge that looks like coffee grounds  If a biopsy was done during your colposcopy you will receive your results by phone/MyChart  Recommendations for follow up pap smears/treatment is dependent on colposcopy/biopsy findings  Monitor for signs of infection and call the office for any fever, chills, severe/worsening pelvic pain, foul smelling vaginal discharge. [phone] [place] [phone]

## 2024-01-24 LAB
LAB AP ASR DISCLAIMER: NORMAL
LABORATORY COMMENT REPORT: NORMAL
PATH REPORT.COMMENTS IMP SPEC: NORMAL
PATH REPORT.FINAL DX SPEC: NORMAL
PATH REPORT.GROSS SPEC: NORMAL
PATH REPORT.RELEVANT HX SPEC: NORMAL
PATH REPORT.TOTAL CANCER: NORMAL

## 2024-03-01 DIAGNOSIS — Z00.00 HEALTH CARE MAINTENANCE: ICD-10-CM

## 2024-03-01 DIAGNOSIS — Z86.69 HISTORY OF TINNITUS: ICD-10-CM

## 2024-03-01 DIAGNOSIS — L82.1 OTHER SEBORRHEIC KERATOSIS: ICD-10-CM

## 2024-03-01 NOTE — PROGRESS NOTES
Orders Placed This Encounter   Procedures    Referral to ENT     Standing Status:   Future     Standing Expiration Date:   9/1/2024     Referral Priority:   Routine     Referral Type:   Consultation     Referral Reason:   Specialty Services Required     Requested Specialty:   Otolaryngology     Number of Visits Requested:   1    Referral to Dermatology     Referral Priority:   Routine     Referral Type:   Consultation     Referral Reason:   Specialty Services Required     Requested Specialty:   Dermatology     Number of Visits Requested:   1

## 2024-04-03 ENCOUNTER — HOSPITAL ENCOUNTER (OUTPATIENT)
Dept: RADIOLOGY | Facility: HOSPITAL | Age: 69
Discharge: HOME | End: 2024-04-03
Payer: MEDICARE

## 2024-04-03 DIAGNOSIS — K76.0 FATTY (CHANGE OF) LIVER, NOT ELSEWHERE CLASSIFIED: ICD-10-CM

## 2024-04-03 PROCEDURE — 76705 ECHO EXAM OF ABDOMEN: CPT | Performed by: RADIOLOGY

## 2024-04-03 PROCEDURE — 76705 ECHO EXAM OF ABDOMEN: CPT

## 2024-04-10 ENCOUNTER — TELEPHONE (OUTPATIENT)
Dept: GASTROENTEROLOGY | Facility: HOSPITAL | Age: 69
End: 2024-04-10
Payer: MEDICARE

## 2024-04-10 NOTE — TELEPHONE ENCOUNTER
----- Message from Adriana Null MD sent at 4/10/2024 11:19 AM EDT -----  Regarding: Call pt for blood work prior to visit on Friday  Hi     Can you call Nirali Rodarte and request she have LFT check prior to her visit this Friday with me. LFT order should be in the chart from our prior plan/recs for her.     Thanks

## 2024-04-10 NOTE — PROGRESS NOTES
Hepatology: Follow up Office Note     Patient: Nirali Rodarte, a 68 y.o. year old female presents for follow up of hepatic steatosis.    PCP: Rica Hauser DO    History of Present Illness   PMH: Dyslipidemia, osteoporosis, anxiety  HPI: This is a 68-year-old female who presents for follow up of hepatic steatosis.   Prior visit hx: She presented to her PCP's office with RUQ abdominal discomfort. Had an ultrasound done which noted fatty liver. Was referred to hepatology for further eval. She was asymptomatic at the time of her initial eval. Since her ultrasound- she stopped use of alcohol, in addition she switched her diet to reduce amount of carbs/fats. Eating more protein- chicken ,fish, tofu. She has also started exercising. Lost about 14-15 lbs in 3-4 months.      Today's visit: Here for a follow up visit today. Feels well. She denies acute symptoms today. Has been able to maintain her weight loss.    Review of Systems   Constitutional/ General: No fever, no night sweats, no weight loss  Eyes: no yellow discoloration  ENT: normal   Cardiovascular: no chest pain, no palpitations  Respiratory: no shortness of breath  Gastrointestinal: denies abdominal pain, nausea, vomiting  Integumentary: no rashes, no yellow discoloration of skin  Neurologic: no weakness or numbness of extremities  Psychiatric: no mood fluctuations  Musculoskeletal: no joint swelling   Genitourinary: no dysuria, no hematuria    All other systems have been reviewed and are negative except as noted in the HPI and above.    PSH: Denies intra-abdominal surgical history  Family history: History of colon cancer in maternal grandmother, brother with history of metastatic malignancy.  Social history: [x] Alcohol use-prior history- in 2019 increased alcohol use after her mom's passing- had cut down to 3 beverages a day. Stopped alcohol use after establishing care in hepatology. Denies history of cigarette smoking, denies history of illicit substance  "use.  Medications: Noted and reviewed    Medications     Current Outpatient Medications   Medication Instructions    cholecalciferol (Vitamin D3) 50 mcg (2,000 unit) capsule oral, Daily         Physical Examination      Visit Vitals  /83 (BP Location: Right arm, Patient Position: Sitting, BP Cuff Size: Adult)   Pulse 56   Temp 36.9 °C (98.5 °F)   Resp 18   Ht 1.676 m (5' 6\")   Wt 74.6 kg (164 lb 6.4 oz)   SpO2 99%   BMI 26.53 kg/m²   Smoking Status Former   BSA 1.86 m²       Constitutional: awake, alert   Eyes: EOMI, anicteric sclera  ENT: no oropharyngeal lesions visualized  Respiratory: Bilateral air entry equal no wheezing  Cardiovascular: regular rate and rhythm, no lower extremity edema  Abdomen: soft, non tender, non distended, bowel sounds present  Integumentary: no wounds on examined skin   Musculoskeletal: no joint swelling   Neurologic: gross motor strength intact   Psychiatric: alert, appropriate mood and affect, oriented to time/place/person    Labs     Lab Results   Component Value Date     10/26/2023    K 3.9 10/26/2023    CREATININE 0.74 10/26/2023    ALBUMIN 4.9 04/11/2024    ALT 17 04/11/2024    AST 19 04/11/2024    ALKPHOS 75 04/11/2024    HGB 13.2 10/26/2023     10/26/2023      April 2023: ALT 22, AST 23, ALP 79, Bili 0.5, neg HBV sAg/sAb/core total Ab/HAV total Ab, HCV Ab  October 2022: Hemoglobin 14.1, platelets 279, WBC 4.7, ALT 17, AST 18, bilirubin 0.6, ALP 81, sodium 138, creatinine 0.74, cholesterol 214, triglycerides 56, LDL 99  Liver enzymes have been within normal range dating back to 2016.    Imaging   Fibroscan Feb 2023: E 4kPa IQR 15%, . F0, S0.     US liver April 2024: LIVER: The liver measures 14.7 cm and is grossly unremarkable and free of any focal lesions.    Ultrasound biliary system: November 2022: Coarsened and hyperechoic hepatic parenchyma. Difficult to penetrate the liver with ultrasound being. Liver is normal in size. No focal hepatic mass lesion. " Impression: Coarsened and hyperechoic hepatic parenchyma, compatible with steatosis or hepatocellular disease.     Assessment and Plan    Nirali Rodarte, a 68 y.o. year old female presents for follow up of metabolic dysfunction and alcohol use associated steatotic liver disease. I have reviewed pertinent provider notes, labs and imaging studies. Discussed results and their interpretation with the patient today.    Encounter Diagnosis   Name Primary?    Hepatic steatosis Yes       No orders of the defined types were placed in this encounter.     # MetALD (SLD)  Hepatic steatosis noted on US imaging in 2022  No evidence of transaminitis on labs  Likely etiology of hepatic steatosis: could be secondary to alcohol use and metabolic liver ds.  Based on clinical evaluation- no overt symptoms of features to suggestive decompensated liver disease  Normal liver stiffness score noted on FibroSCAN in 2023.     On most recent assessment, US liver shows normal appearance of the liver and normal liver enzymes.   Discussed with pt measures to continue avoidance of alcohol use and to continue healthy nutritional habits.   She is advised to follow up with her PCP- to have routine monitoring of the liver; at least an annual LFT check.   Need for future US liver can be determined on her clinical course/labs per PCP.      Can return to hepatology on an as needed basis.

## 2024-04-11 ENCOUNTER — LAB (OUTPATIENT)
Dept: LAB | Facility: LAB | Age: 69
End: 2024-04-11
Payer: MEDICARE

## 2024-04-11 DIAGNOSIS — K76.0 NON-ALCOHOLIC FATTY LIVER DISEASE: ICD-10-CM

## 2024-04-11 LAB
ALBUMIN SERPL BCP-MCNC: 4.9 G/DL (ref 3.4–5)
ALP SERPL-CCNC: 75 U/L (ref 33–136)
ALT SERPL W P-5'-P-CCNC: 17 U/L (ref 7–45)
AST SERPL W P-5'-P-CCNC: 19 U/L (ref 9–39)
BILIRUB DIRECT SERPL-MCNC: 0.1 MG/DL (ref 0–0.3)
BILIRUB SERPL-MCNC: 0.7 MG/DL (ref 0–1.2)
PROT SERPL-MCNC: 7.2 G/DL (ref 6.4–8.2)

## 2024-04-11 PROCEDURE — 36415 COLL VENOUS BLD VENIPUNCTURE: CPT

## 2024-04-11 PROCEDURE — 80076 HEPATIC FUNCTION PANEL: CPT

## 2024-04-11 NOTE — TELEPHONE ENCOUNTER
Hepatology Nurse Coordinator Note   Patient called back and left a voicemail stating okay to leave a detailed message on her voicemail. Called back. Left a voicemail for patient indicating Dr. Null would like her to try to get her blood work completed prior to her visit tomorrow. Left a call back number should there be any questions, or concerns.

## 2024-04-12 ENCOUNTER — OFFICE VISIT (OUTPATIENT)
Dept: GASTROENTEROLOGY | Facility: CLINIC | Age: 69
End: 2024-04-12
Payer: MEDICARE

## 2024-04-12 VITALS
WEIGHT: 164.4 LBS | HEART RATE: 56 BPM | RESPIRATION RATE: 18 BRPM | OXYGEN SATURATION: 99 % | HEIGHT: 66 IN | BODY MASS INDEX: 26.42 KG/M2 | TEMPERATURE: 98.5 F | SYSTOLIC BLOOD PRESSURE: 170 MMHG | DIASTOLIC BLOOD PRESSURE: 83 MMHG

## 2024-04-12 DIAGNOSIS — K76.0 HEPATIC STEATOSIS: Primary | ICD-10-CM

## 2024-04-12 PROCEDURE — 1036F TOBACCO NON-USER: CPT | Performed by: STUDENT IN AN ORGANIZED HEALTH CARE EDUCATION/TRAINING PROGRAM

## 2024-04-12 PROCEDURE — 99213 OFFICE O/P EST LOW 20 MIN: CPT | Performed by: STUDENT IN AN ORGANIZED HEALTH CARE EDUCATION/TRAINING PROGRAM

## 2024-04-12 PROCEDURE — 1126F AMNT PAIN NOTED NONE PRSNT: CPT | Performed by: STUDENT IN AN ORGANIZED HEALTH CARE EDUCATION/TRAINING PROGRAM

## 2024-04-12 PROCEDURE — 1159F MED LIST DOCD IN RCRD: CPT | Performed by: STUDENT IN AN ORGANIZED HEALTH CARE EDUCATION/TRAINING PROGRAM

## 2024-04-12 ASSESSMENT — PAIN SCALES - GENERAL: PAINLEVEL: 0-NO PAIN

## 2024-04-12 NOTE — PATIENT INSTRUCTIONS
Nirali Rodarte,     Thank you for coming in for your hepatology office visit today. As per our discussion, your liver lab check showed normal liver enzymes and the ultrasound showed normal appearing liver. These are reassuring results.     I recommend you continue to follow up with your primary care doctor and have routine monitoring of the liver (labs can be once a year and ultrasound of the liver on an as needed basis or in a few years based on labs). Continue to implement healthy nutrition and avoid excess alcohol.     You can see me in the future on an as needed basis.   My 's office at [phone] (Ms Nadiya Day)   My nurse coordinator at [phone] (Ms Lubna MONGE).     Thank you for allowing me to participate in your care.     Sincerely,  Adriana Null MD  Hepatology

## 2024-06-20 ENCOUNTER — HOSPITAL ENCOUNTER (OUTPATIENT)
Dept: RADIOLOGY | Facility: CLINIC | Age: 69
Discharge: HOME | End: 2024-06-20
Payer: MEDICARE

## 2024-06-20 VITALS — HEIGHT: 66 IN | WEIGHT: 164 LBS | BODY MASS INDEX: 26.36 KG/M2

## 2024-06-20 DIAGNOSIS — Z12.31 ENCOUNTER FOR SCREENING MAMMOGRAM FOR BREAST CANCER: ICD-10-CM

## 2024-06-20 PROCEDURE — 77067 SCR MAMMO BI INCL CAD: CPT

## 2024-10-02 ENCOUNTER — TELEPHONE (OUTPATIENT)
Dept: OBSTETRICS AND GYNECOLOGY | Facility: CLINIC | Age: 69
End: 2024-10-02
Payer: MEDICARE

## 2024-10-10 ENCOUNTER — TELEPHONE (OUTPATIENT)
Dept: OBSTETRICS AND GYNECOLOGY | Facility: CLINIC | Age: 69
End: 2024-10-10
Payer: MEDICARE

## 2024-10-16 ENCOUNTER — TELEPHONE (OUTPATIENT)
Dept: OBSTETRICS AND GYNECOLOGY | Facility: CLINIC | Age: 69
End: 2024-10-16
Payer: MEDICARE

## 2024-10-16 DIAGNOSIS — R39.9 UTI SYMPTOMS: Primary | ICD-10-CM

## 2024-10-16 NOTE — TELEPHONE ENCOUNTER
TC with pt.   Urinary sx of burning, urgency. Have lessened today.  Urine culture req sent to lab and pt will do culture tomorrow.   Will call for rx if sx get worse before culture returns.

## 2024-10-17 ENCOUNTER — LAB (OUTPATIENT)
Dept: LAB | Facility: LAB | Age: 69
End: 2024-10-17

## 2024-10-17 DIAGNOSIS — R39.9 UTI SYMPTOMS: ICD-10-CM

## 2024-10-17 PROCEDURE — 87086 URINE CULTURE/COLONY COUNT: CPT

## 2024-10-18 LAB — BACTERIA UR CULT: NORMAL

## 2024-10-28 ENCOUNTER — HOSPITAL ENCOUNTER (OUTPATIENT)
Dept: RADIOLOGY | Facility: HOSPITAL | Age: 69
Discharge: HOME | End: 2024-10-28
Payer: MEDICARE

## 2024-10-28 ENCOUNTER — OFFICE VISIT (OUTPATIENT)
Dept: ORTHOPEDIC SURGERY | Facility: CLINIC | Age: 69
End: 2024-10-28
Payer: MEDICARE

## 2024-10-28 DIAGNOSIS — M25.562 LEFT KNEE PAIN, UNSPECIFIED CHRONICITY: ICD-10-CM

## 2024-10-28 DIAGNOSIS — M25.562 LEFT KNEE PAIN, UNSPECIFIED CHRONICITY: Primary | ICD-10-CM

## 2024-10-28 DIAGNOSIS — M17.12 PRIMARY OSTEOARTHRITIS OF LEFT KNEE: ICD-10-CM

## 2024-10-28 PROCEDURE — 1160F RVW MEDS BY RX/DR IN RCRD: CPT

## 2024-10-28 PROCEDURE — 20610 DRAIN/INJ JOINT/BURSA W/O US: CPT

## 2024-10-28 PROCEDURE — 73564 X-RAY EXAM KNEE 4 OR MORE: CPT | Mod: LT

## 2024-10-28 PROCEDURE — 1036F TOBACCO NON-USER: CPT

## 2024-10-28 PROCEDURE — 1159F MED LIST DOCD IN RCRD: CPT

## 2024-10-28 PROCEDURE — 99213 OFFICE O/P EST LOW 20 MIN: CPT

## 2024-10-28 PROCEDURE — 1125F AMNT PAIN NOTED PAIN PRSNT: CPT

## 2024-10-28 RX ORDER — LIDOCAINE HYDROCHLORIDE 5 MG/ML
4 INJECTION, SOLUTION INFILTRATION; PERINEURAL
Status: COMPLETED | OUTPATIENT
Start: 2024-10-28 | End: 2024-10-28

## 2024-10-28 RX ORDER — TRIAMCINOLONE ACETONIDE 40 MG/ML
40 INJECTION, SUSPENSION INTRA-ARTICULAR; INTRAMUSCULAR
Status: COMPLETED | OUTPATIENT
Start: 2024-10-28 | End: 2024-10-28

## 2024-10-28 ASSESSMENT — PAIN - FUNCTIONAL ASSESSMENT: PAIN_FUNCTIONAL_ASSESSMENT: 0-10

## 2024-10-28 ASSESSMENT — PAIN SCALES - GENERAL: PAINLEVEL_OUTOF10: 6

## 2024-12-17 ENCOUNTER — APPOINTMENT (OUTPATIENT)
Dept: PRIMARY CARE | Facility: CLINIC | Age: 69
End: 2024-12-17
Payer: MEDICARE

## 2024-12-23 ENCOUNTER — APPOINTMENT (OUTPATIENT)
Dept: OBSTETRICS AND GYNECOLOGY | Facility: CLINIC | Age: 69
End: 2024-12-23
Payer: MEDICARE

## 2024-12-23 VITALS
WEIGHT: 170 LBS | SYSTOLIC BLOOD PRESSURE: 120 MMHG | DIASTOLIC BLOOD PRESSURE: 80 MMHG | HEIGHT: 66 IN | BODY MASS INDEX: 27.32 KG/M2

## 2024-12-23 DIAGNOSIS — Z01.419 ENCOUNTER FOR WELL WOMAN EXAM WITH ROUTINE GYNECOLOGICAL EXAM: Primary | ICD-10-CM

## 2024-12-23 DIAGNOSIS — R87.810 CERVICAL HIGH RISK HPV (HUMAN PAPILLOMAVIRUS) TEST POSITIVE: ICD-10-CM

## 2024-12-23 DIAGNOSIS — Z12.31 ENCOUNTER FOR SCREENING MAMMOGRAM FOR BREAST CANCER: ICD-10-CM

## 2024-12-23 DIAGNOSIS — N87.0 DYSPLASIA OF CERVIX, LOW GRADE (CIN 1): ICD-10-CM

## 2024-12-23 DIAGNOSIS — R87.612 LOW GRADE SQUAMOUS INTRAEPITH LESION ON CYTOLOGIC SMEAR CERVIX (LGSIL): ICD-10-CM

## 2024-12-23 PROCEDURE — 1160F RVW MEDS BY RX/DR IN RCRD: CPT | Performed by: OBSTETRICS & GYNECOLOGY

## 2024-12-23 PROCEDURE — 3008F BODY MASS INDEX DOCD: CPT | Performed by: OBSTETRICS & GYNECOLOGY

## 2024-12-23 PROCEDURE — 99397 PER PM REEVAL EST PAT 65+ YR: CPT | Performed by: OBSTETRICS & GYNECOLOGY

## 2024-12-23 PROCEDURE — 1159F MED LIST DOCD IN RCRD: CPT | Performed by: OBSTETRICS & GYNECOLOGY

## 2024-12-23 PROCEDURE — 1036F TOBACCO NON-USER: CPT | Performed by: OBSTETRICS & GYNECOLOGY

## 2024-12-23 PROCEDURE — 87624 HPV HI-RISK TYP POOLED RSLT: CPT

## 2024-12-23 NOTE — PROGRESS NOTES
ASSESSMENT/PLAN    Encounter for well woman exam with routine gynecological exam (Primary)  Low grade squamous intraepith lesion on cytologic smear cervix (lgsil)  Cervical high risk HPV (human papillomavirus) test positive  Dysplasia of cervix, low grade (KELLE 1)  Routine well woman visit.   Your exam was normal today.   A pap and HPV test was done. If you are signed onto the  MY CHART, you will be notified about your pap results through the MY CHART in 2-3 weeks.      Encounter for screening mammogram for breast cancer  Your clinical breast exam was normal.   A screening mammogram order has been placed for after 6-.     Resident clinic recommended for PCP care.      ------------------------------------------------------------------------------    SUBJECTIVE      PAP 12- NEG HPV POS 16. Colpo 1- LGSIL on ECC.   PAP 12-1-22 NEG HPV POS 16. Colpo 12- nl ECC  PAP 11-2-21 NEG HPV NEG, 10-13-20 NEG HPV POS, COLPO 10-30-20 NEG, 10-11-19 NEG HPV POS, COLPO 10-25-19 NEG  MAMMO 6-  DEXA 9-23-22 T=-2.5  COLON 11-12-19           HPI    68 yo for routine well woman visit.   Her last visit was 1/2024 for colposcopy.   h/o abnormal paps as above. Last pap 12/2023 was normal, HPV positive #16.  Had colpo, LGSIL on ECC.   Looking for a new PCP.   In past diagnosed with fatty liver. Normal labs. Is to have followup ultrasound.   Single, no smoke, no ETOH.       REVIEW OF SYSTEMS    Constitutional: + weight gain, no fatigue.   ENT: no hearing loss.  Cardiovascular: no chest pain, no palpitations.  Respiratory: no shortness of breath.  Gastrointestinal: no abdominal pain, no constipation, no nausea, no diarrhea.  Musculoskeletal: no back pain.  Lymphatic: no swollen glands.  Genitourinary: no pelvic pain, + urinary urgency, no urinary incontinence, no change in urinary frequency, no vaginal dryness, no vaginal itching,  no vaginal discharge, no unexplained vaginal bleeding, no lesion/sore.  "  Breasts: no breast pain, no nipple discharge, no breast lump.   Neurological: no headache, no numbness, no dizziness.   Psychiatric: no sleep disturbances, + anxiety, + depression.   Endocrine: no hot flashes, no loss of hair, no hirsutism.       OBJECTIVE    /80   Ht 1.676 m (5' 6\")   Wt 77.1 kg (170 lb)   BMI 27.44 kg/m²      Physical Exam     Constitutional: Alert and in no acute distress. Well developed, well nourished   Head and Face: Head and face: normal   Eyes: Normal external exam - nonicteric sclera.  Ears, Nose, Mouth, and Throat: External inspection of ears and nose: normal   Neck: no neck asymmetry. Supple and thyroid not enlarged and there were no palpable thyroid nodules   Cardiovascular: Heart rate and rhythm were normal  Pulmonary: No respiratory distress and clear bilateral breath sounds   Chest: Breasts: normal appearance, no nipple discharge, no skin changes. Palpation of breasts and axillae: no palpable mass, no axillary lymphadenopathy   Abdomen: soft nontender; no abdominal mass palpated, no organomegaly and no hernias   Genitourinary: external genitalia: normal appearing vulva and labia without lesions. No inguinal lymphadenopathy,    Urethra: normal.   Bladder: normal on palpation.   Perianal area: normal   Vagina: normal, without significant discharge, no lesions.  Cervix: Normal appearing without lesions. Narrowest speculum used for pap.   Uterus: Normal, mobile, nontender.  Right and left adnexa/parametria: Normal  Skin: normal skin color and pigmentation, normal skin turgor, and no rash  Psychiatric: alert and oriented x 3., affect normal to patient baseline and mood: appropriate        Sarahi Butcher MD    "

## 2025-01-03 ENCOUNTER — APPOINTMENT (OUTPATIENT)
Dept: PRIMARY CARE | Facility: CLINIC | Age: 70
End: 2025-01-03
Payer: MEDICARE

## 2025-01-08 LAB

## 2025-02-10 ENCOUNTER — APPOINTMENT (OUTPATIENT)
Dept: PRIMARY CARE | Facility: CLINIC | Age: 70
End: 2025-02-10
Payer: MEDICARE

## 2025-02-10 VITALS
HEART RATE: 67 BPM | DIASTOLIC BLOOD PRESSURE: 88 MMHG | OXYGEN SATURATION: 99 % | HEIGHT: 66 IN | SYSTOLIC BLOOD PRESSURE: 140 MMHG | WEIGHT: 172 LBS | BODY MASS INDEX: 27.64 KG/M2

## 2025-02-10 DIAGNOSIS — M81.0 AGE RELATED OSTEOPOROSIS, UNSPECIFIED PATHOLOGICAL FRACTURE PRESENCE: ICD-10-CM

## 2025-02-10 DIAGNOSIS — H93.13 TINNITUS OF BOTH EARS: ICD-10-CM

## 2025-02-10 DIAGNOSIS — D48.9 NEOPLASM OF UNCERTAIN BEHAVIOR: ICD-10-CM

## 2025-02-10 DIAGNOSIS — Z00.00 MEDICARE ANNUAL WELLNESS VISIT, SUBSEQUENT: Primary | ICD-10-CM

## 2025-02-10 DIAGNOSIS — L98.9 SKIN LESION: ICD-10-CM

## 2025-02-10 DIAGNOSIS — Z13.1 DIABETES MELLITUS SCREENING: ICD-10-CM

## 2025-02-10 DIAGNOSIS — R53.83 FATIGUE, UNSPECIFIED TYPE: ICD-10-CM

## 2025-02-10 DIAGNOSIS — Z00.00 ROUTINE GENERAL MEDICAL EXAMINATION AT HEALTH CARE FACILITY: ICD-10-CM

## 2025-02-10 DIAGNOSIS — Z13.29 SCREENING FOR THYROID DISORDER: ICD-10-CM

## 2025-02-10 DIAGNOSIS — F32.A DEPRESSION, UNSPECIFIED DEPRESSION TYPE: ICD-10-CM

## 2025-02-10 DIAGNOSIS — H93.93 UNSPECIFIED DISORDER OF EAR, BILATERAL: ICD-10-CM

## 2025-02-10 DIAGNOSIS — R79.9 ABNORMAL FINDING OF BLOOD CHEMISTRY, UNSPECIFIED: ICD-10-CM

## 2025-02-10 DIAGNOSIS — F41.9 ANXIETY: ICD-10-CM

## 2025-02-10 DIAGNOSIS — E78.5 DYSLIPIDEMIA: ICD-10-CM

## 2025-02-10 PROCEDURE — 99397 PER PM REEVAL EST PAT 65+ YR: CPT

## 2025-02-10 PROCEDURE — G0439 PPPS, SUBSEQ VISIT: HCPCS

## 2025-02-10 PROCEDURE — 3008F BODY MASS INDEX DOCD: CPT

## 2025-02-10 PROCEDURE — 1170F FXNL STATUS ASSESSED: CPT

## 2025-02-10 PROCEDURE — 11300 SHAVE SKIN LESION 0.5 CM/<: CPT | Performed by: STUDENT IN AN ORGANIZED HEALTH CARE EDUCATION/TRAINING PROGRAM

## 2025-02-10 PROCEDURE — 1159F MED LIST DOCD IN RCRD: CPT

## 2025-02-10 PROCEDURE — 99214 OFFICE O/P EST MOD 30 MIN: CPT

## 2025-02-10 PROCEDURE — 1036F TOBACCO NON-USER: CPT

## 2025-02-10 RX ORDER — LIDOCAINE HYDROCHLORIDE 10 MG/ML
1 INJECTION, SOLUTION INFILTRATION; PERINEURAL ONCE
Status: COMPLETED | OUTPATIENT
Start: 2025-02-10 | End: 2025-02-10

## 2025-02-10 RX ADMIN — LIDOCAINE HYDROCHLORIDE 1 ML: 10 INJECTION, SOLUTION INFILTRATION; PERINEURAL at 15:09

## 2025-02-10 ASSESSMENT — ENCOUNTER SYMPTOMS
CHEST TIGHTNESS: 0
LOSS OF SENSATION IN FEET: 0
NUMBNESS: 0
WEAKNESS: 0
CHILLS: 0
RHINORRHEA: 0
WHEEZING: 0
DYSURIA: 0
SHORTNESS OF BREATH: 0
ABDOMINAL PAIN: 0
HEMATURIA: 0
FATIGUE: 0
COUGH: 0
NERVOUS/ANXIOUS: 0
BLOOD IN STOOL: 0
DIARRHEA: 0
FREQUENCY: 0
CONSTIPATION: 0
HEADACHES: 0
FEVER: 0
JOINT SWELLING: 0
OCCASIONAL FEELINGS OF UNSTEADINESS: 0
DEPRESSION: 0
PALPITATIONS: 0

## 2025-02-10 ASSESSMENT — ACTIVITIES OF DAILY LIVING (ADL)
TAKING_MEDICATION: INDEPENDENT
DRESSING: INDEPENDENT
MANAGING_FINANCES: INDEPENDENT
GROCERY_SHOPPING: INDEPENDENT
DOING_HOUSEWORK: INDEPENDENT
BATHING: INDEPENDENT

## 2025-02-10 ASSESSMENT — PATIENT HEALTH QUESTIONNAIRE - PHQ9
2. FEELING DOWN, DEPRESSED OR HOPELESS: SEVERAL DAYS
SUM OF ALL RESPONSES TO PHQ9 QUESTIONS 1 AND 2: 2
1. LITTLE INTEREST OR PLEASURE IN DOING THINGS: SEVERAL DAYS

## 2025-02-10 NOTE — ASSESSMENT & PLAN NOTE
Orders:    CBC; Future    Comprehensive Metabolic Panel; Future    TSH with reflex to Free T4 if abnormal; Future    Referral to [facility]; Future

## 2025-02-10 NOTE — PROGRESS NOTES
Subjective   Reason for Visit: Nirlai Rodarte is an 69 y.o. female here for a Medicare Wellness visit and to discuss multiple new complaints.     Patient stated that she was previously diagnosed 1st in the right  knee with patellofemoral syndrome and received a corticosteroid injection and physical therapy then her left knee started giving out and got a corticosteroid injection and physical therapy for that knee. When she saw the orthopedic specialist they said she will need a knee replacement sometime in the future but patient states that she wants to put that off as long as she can.    For the past couple of years, patient has noticed some ringing in both of her ears mainly at night when she wakes up or when the house is really quiet or when she is trying to fall back asleep. Patient is considering getting a hearing test and wonders if she needs to see a specialist or if there is treatment for the ringing in her ears.    Patient also stated that she has an ultrasound of her liver and follow up with a hepatologist who she has been following for the past year. Patient stated that her brother has liver cancer and she ended up having an initial ultrasound of her liver that was concerning and so she went through a tough time mentally the past year with the work up but everything was fine and is hoping that everything will be fine with this next ultrasound.    Patient also complains of frequent urination mainly at night. Patient states she gets up 4-5 times per night to go pee. She drinks water all the way up to bed time. Does not notice any other symptoms besides the urinary frequency.    Patient is also interested in counseling because she endorses that she has a lot of anxiety and depression after her brother  in . Her brother was her last support system and they lived together. She finds herself worrying about things all the time and often feeling lonely, hopeless, and worthless. Patient also states that  "she has a lot of anxiety at night when she is by herself at home and is afraid everything will go wrong.      Over the last few months patient has noticed a new lesion on left forearm and is worried that is might be skin cancer due to how rapidly it appeared. According to the patient she has similar looking lesions on her forehead and around her eyes but this one is new and has rapidly grown over the last few months. So she wanted to see if was skin cancer and if needed to be removed.        Patient Care Team:  Shannan Fiore DO as PCP - General (Family Medicine)     Review of Systems   Constitutional:  Negative for chills, fatigue and fever.   HENT:  Negative for congestion, ear pain and rhinorrhea.    Eyes:  Negative for visual disturbance.   Respiratory:  Negative for cough, chest tightness, shortness of breath and wheezing.    Cardiovascular:  Negative for chest pain, palpitations and leg swelling.   Gastrointestinal:  Negative for abdominal pain, blood in stool, constipation and diarrhea.   Genitourinary:  Negative for dysuria, frequency, hematuria and urgency.   Musculoskeletal:  Negative for joint swelling.   Skin:  Negative for rash.   Neurological:  Negative for syncope, weakness, numbness and headaches.   Psychiatric/Behavioral:  The patient is not nervous/anxious.        Objective   Vitals:  /88   Pulse 67   Ht 1.676 m (5' 6\")   Wt 78 kg (172 lb)   SpO2 99%   BMI 27.76 kg/m²       Physical Exam  Constitutional:       Appearance: Normal appearance.   HENT:      Head: Normocephalic and atraumatic.   Eyes:      Extraocular Movements: Extraocular movements intact.      Pupils: Pupils are equal, round, and reactive to light.   Cardiovascular:      Rate and Rhythm: Normal rate and regular rhythm.   Pulmonary:      Effort: Pulmonary effort is normal.      Breath sounds: Normal breath sounds.   Abdominal:      General: There is no distension.      Palpations: Abdomen is soft.      Tenderness: There " is no abdominal tenderness. There is no guarding or rebound.   Musculoskeletal:         General: Normal range of motion.   Skin:     General: Skin is warm and dry.      Capillary Refill: Capillary refill takes less than 2 seconds.   Neurological:      General: No focal deficit present.      Mental Status: She is alert and oriented to person, place, and time. Mental status is at baseline.   Psychiatric:         Mood and Affect: Mood normal.         Behavior: Behavior normal.         Thought Content: Thought content normal.         Judgment: Judgment normal.         Assessment & Plan  Routine general medical examination at health care facility    Orders:    1 Year Follow Up In Primary Care - Wellness Exam; Future    BMI 27.0-27.9,adult         Screening for thyroid disorder    Orders:    TSH with reflex to Free T4 if abnormal; Future    Diabetes mellitus screening    Orders:    Hemoglobin A1C; Future    Dyslipidemia    Orders:    Lipid Panel; Future    Anxiety    Orders:    CBC; Future    Comprehensive Metabolic Panel; Future    TSH with reflex to Free T4 if abnormal; Future    Referral to [facility]; Future    Age related osteoporosis, unspecified pathological fracture presence    Orders:    XR DEXA bone density; Future    Tinnitus of both ears    Orders:    Comprehensive hearing test; Future    Referral to Audiology; Future    Depression, unspecified depression type    Orders:    Referral to [facility]; Future    Abnormal finding of blood chemistry, unspecified    Orders:    Hemoglobin A1C; Future    Fatigue, unspecified type    Orders:    CBC; Future    Unspecified disorder of ear, bilateral    Orders:    Comprehensive hearing test; Future    Skin lesion    Orders:    lidocaine (Xylocaine) 10 mg/mL (1 %) injection 1 mL    Dermatopathology-[facility]    Shaving Epidermal/Dermal    Neoplasm of uncertain behavior    Orders:    Dermatopathology-[facility]    Shaving  "Epidermal/Dermal         Nirali Rodarte \"Selina\" is a 69 y.o. female who presents for annual medicare wellness and follow up of multiple concerns. Medications were reviewed and refills were ordered and sent to preferred pharmacy as needed. Annual lab work orders were placed. Will follow up with results. Complete physical exam performed. Discussed appropriate annual screenings and placed orders per patients preference. Will follow up with patient in one year for medicare wellness or sooner if needed. Discussed patients tinnitus and recommended a hearing evaluation with a referral to an audiologist. Patient agreed to treatment plan. No red flag signs or symptoms of unilateral tinnitus ir weakness on one side of face. Referral for counseling for anxiety and depression was given to patient. Recommended that if counseling does not improve her symptoms then to try management with medication. Skin lesion on left forearm looks like a keratinous cyst. Skin shave biopsy was completed with procedure noted below.    Patient was staffed with Dr. Adebayo Bhat DO, PGY-3  [facility]    Patient Care Team:  Shannan Fiore DO as PCP - General (Family Medicine)       Shaving Epidermal/Dermal    Date/Time: 2/10/2025 3:05 PM    Performed by: Cleo Bhat DO  Authorized by: Shannan Firoe DO    Number of Lesions: 1  Lesion 1:     Body area: upper extremity    Upper extremity location: L lower arm    Initial size (mm): 3    Final defect size (mm): 4    Malignancy: malignancy unknown      Destruction method: curettage      Comments:  1 mL of 1% lidocaine was injected subcutaneously surrounding lesion. Patient was prepped in sterile fashion with iodine. Lesion was removed using 11 blade and bleeding was stopped using pressure and silver nitrate. Bleeding was controlled and area was dressed with non adherent dressing and tape. Return precautions was discussed with patient incase of signs of infection " such as erythema or purulent discharge.

## 2025-02-11 ENCOUNTER — TELEPHONE (OUTPATIENT)
Dept: PRIMARY CARE | Facility: CLINIC | Age: 70
End: 2025-02-11
Payer: MEDICARE

## 2025-02-12 LAB
LABORATORY COMMENT REPORT: NORMAL
PATH REPORT.FINAL DX SPEC: NORMAL
PATH REPORT.GROSS SPEC: NORMAL
PATH REPORT.MICROSCOPIC SPEC OTHER STN: NORMAL
PATH REPORT.RELEVANT HX SPEC: NORMAL
PATH REPORT.TOTAL CANCER: NORMAL

## 2025-02-19 ENCOUNTER — CLINICAL SUPPORT (OUTPATIENT)
Dept: AUDIOLOGY | Facility: CLINIC | Age: 70
End: 2025-02-19
Payer: MEDICARE

## 2025-02-19 DIAGNOSIS — H90.3 SENSORINEURAL HEARING LOSS, BILATERAL: Primary | ICD-10-CM

## 2025-02-19 DIAGNOSIS — H93.13 TINNITUS OF BOTH EARS: ICD-10-CM

## 2025-02-19 PROCEDURE — 92550 TYMPANOMETRY & REFLEX THRESH: CPT | Mod: 52

## 2025-02-19 PROCEDURE — 92557 COMPREHENSIVE HEARING TEST: CPT

## 2025-02-19 ASSESSMENT — PAIN - FUNCTIONAL ASSESSMENT: PAIN_FUNCTIONAL_ASSESSMENT: 0-10

## 2025-02-19 ASSESSMENT — PAIN SCALES - GENERAL: PAINLEVEL_OUTOF10: 0 - NO PAIN

## 2025-02-19 NOTE — LETTER
"2025     Shannan Pickering DO  [address] [place] [id number]    Patient: Selina Rodarte   YOB: 1955   Date of Visit: 2025       Dear Dr. Shannan Pickering DO:    Thank you for referring Selina Rodarte to me for evaluation. Below are my notes for this consultation.  If you have questions, please do not hesitate to call me. I look forward to following your patient along with you.       Sincerely,     Padmini Douglass      CC: No Recipients  ______________________________________________________________________________________    ADULT AUDIOLOGY AUDIOMETRIC EVALUATION    Name:  Nirali Rodarte \"Selina\"  :  1955  Age:  69 y.o.  Date of Evaluation:  2025    HISTORY  Nirali Rodarte is seen today at the request of Shannan Pickering DO for an evaluation of hearing.  Patient arrives with the complaint of bilateral tinnitus, slight positional vertigo and slight difficulty hearing in background noise. Patient denies ear pain, ear pressure, ear drainage, ear infections, ear surgeries, noise exposure, family history of hearing loss.      AUDIOLOGIC EVALUATION    OTOSCOPY  Otoscopic inspection revealed clear canals and visualization of the eardrum bilaterally.    IMMITTANCE  Normal tympanograms were obtained bilaterally, consistent with a normal moving eardrums and the likely absence of fluid.    Ipsilateral acoustic reflexes were tested and present  at 500Hz, 1000Hz, 2000Hz, and 4000Hz bilaterally.    AUDIOMETRIC TESTING  Pure tone audiometry conducted via insert headphones from 125 Hz - 8000 Hz with good reliability was consistent with normal hearing from 125 Hz - 2000 Hz sloping to a mild sensorineural hearing loss through 8000 Hz.     SPEECH RECOGNITION TESTING (SRT)  SRT was in agreement with pure tone averages bilaterally ( Right: 15 dB HL, Left: 15 dB HL).    WORD RECOGNITION SCORE (WRS)  WRS was excellent (100%) in the right ear and excellent (100%) in the left ear using " recorded ordered by difficulty NU6 word list.    SENTENCE IN NOISE (SIN)  Binaural QuickSIN indicated a SNR score within normal limits.    IMPRESSIONS:  The results of today's assessment after consistent with:  -Normal tympanograms  -Present acoustic reflexes  -Normal hearing from 125 Hz - 2000 Hz sloping to a mild sensorineural hearing loss through 8000 Hz    The patient was counseled with regard to the findings. Selina is not a candidate for hearing aids at this time. I recommended she try the [employer] Tinnitus Relief katelyn at night. She should try to reduce stress, decrease intake of sugar, caffine and alcohol and increase water intake to determine if this helps her tinnitus. She reports she is having her annual labs soon. If her PCP thinks appropriate, her vitamin and mineral counts should be checked to ensure adequate numbers as some reduced levels and aid in tinnitus.     RECOMMENDATIONS:  Treatment Plan:.  Follow up with ENT/PCP as recommended.  Annual hearing assessments as recommended. Follow up sooner if patient feels hearing or symptoms have changed.  Contact the clinic with questions or concerns at [phone].     Time Stamp: 35 minutes     Padmini Mckeon, [facility]  Licensed Audiologist     increasing jaundice

## 2025-02-19 NOTE — PROGRESS NOTES
"ADULT AUDIOLOGY AUDIOMETRIC EVALUATION    Name:  Nirali Rodarte \"Selina\"  :  1955  Age:  69 y.o.  Date of Evaluation:  2025    HISTORY  Nirali Rodarte is seen today at the request of Shannan Pickering DO for an evaluation of hearing.  Patient arrives with the complaint of bilateral tinnitus, slight positional vertigo and slight difficulty hearing in background noise. Patient denies ear pain, ear pressure, ear drainage, ear infections, ear surgeries, noise exposure, family history of hearing loss.      AUDIOLOGIC EVALUATION    OTOSCOPY  Otoscopic inspection revealed clear canals and visualization of the eardrum bilaterally.    IMMITTANCE  Normal tympanograms were obtained bilaterally, consistent with a normal moving eardrums and the likely absence of fluid.    Ipsilateral acoustic reflexes were tested and present  at 500Hz, 1000Hz, 2000Hz, and 4000Hz bilaterally.    AUDIOMETRIC TESTING  Pure tone audiometry conducted via insert headphones from 125 Hz - 8000 Hz with good reliability was consistent with normal hearing from 125 Hz - 2000 Hz sloping to a mild sensorineural hearing loss through 8000 Hz.     SPEECH RECOGNITION TESTING (SRT)  SRT was in agreement with pure tone averages bilaterally ( Right: 15 dB HL, Left: 15 dB HL).    WORD RECOGNITION SCORE (WRS)  WRS was excellent (100%) in the right ear and excellent (100%) in the left ear using recorded ordered by difficulty NU6 word list.    SENTENCE IN NOISE (SIN)  Binaural QuickSIN indicated a SNR score within normal limits.    IMPRESSIONS:  The results of today's assessment after consistent with:  -Normal tympanograms  -Present acoustic reflexes  -Normal hearing from 125 Hz - 2000 Hz sloping to a mild sensorineural hearing loss through 8000 Hz    The patient was counseled with regard to the findings. Selina is not a candidate for hearing aids at this time. I recommended she try the [employer] Tinnitus Relief katelyn at night. She should try to reduce stress, " decrease intake of sugar, caffine and alcohol and increase water intake to determine if this helps her tinnitus. She reports she is having her annual labs soon. If her PCP thinks appropriate, her vitamin and mineral counts should be checked to ensure adequate numbers as some reduced levels and aid in tinnitus.     RECOMMENDATIONS:  Treatment Plan:.  Follow up with ENT/PCP as recommended.  Annual hearing assessments as recommended. Follow up sooner if patient feels hearing or symptoms have changed.  Contact the clinic with questions or concerns at [phone].     Time Stamp: 35 minutes     Padmini Mckeon, [facility]  Licensed Audiologist

## 2025-02-26 LAB
ALBUMIN SERPL-MCNC: 4.6 G/DL (ref 3.6–5.1)
ALP SERPL-CCNC: 75 U/L (ref 37–153)
ALT SERPL-CCNC: 15 U/L (ref 6–29)
ANION GAP SERPL CALCULATED.4IONS-SCNC: 6 MMOL/L (CALC) (ref 7–17)
AST SERPL-CCNC: 16 U/L (ref 10–35)
BILIRUB SERPL-MCNC: 0.6 MG/DL (ref 0.2–1.2)
BUN SERPL-MCNC: 16 MG/DL (ref 7–25)
CALCIUM SERPL-MCNC: 9.5 MG/DL (ref 8.6–10.4)
CHLORIDE SERPL-SCNC: 105 MMOL/L (ref 98–110)
CHOLEST SERPL-MCNC: 195 MG/DL
CHOLEST/HDLC SERPL: 2.1 (CALC)
CO2 SERPL-SCNC: 29 MMOL/L (ref 20–32)
CREAT SERPL-MCNC: 0.72 MG/DL (ref 0.5–1.05)
EGFRCR SERPLBLD CKD-EPI 2021: 90 ML/MIN/1.73M2
ERYTHROCYTE [DISTWIDTH] IN BLOOD BY AUTOMATED COUNT: 12.6 % (ref 11–15)
EST. AVERAGE GLUCOSE BLD GHB EST-MCNC: 117 MG/DL
EST. AVERAGE GLUCOSE BLD GHB EST-SCNC: 6.5 MMOL/L
GLUCOSE SERPL-MCNC: 103 MG/DL (ref 65–99)
HBA1C MFR BLD: 5.7 % OF TOTAL HGB
HCT VFR BLD AUTO: 41.8 % (ref 35–45)
HDLC SERPL-MCNC: 92 MG/DL
HGB BLD-MCNC: 14 G/DL (ref 11.7–15.5)
LDLC SERPL CALC-MCNC: 89 MG/DL (CALC)
MCH RBC QN AUTO: 29.2 PG (ref 27–33)
MCHC RBC AUTO-ENTMCNC: 33.5 G/DL (ref 32–36)
MCV RBC AUTO: 87.1 FL (ref 80–100)
NONHDLC SERPL-MCNC: 103 MG/DL (CALC)
PLATELET # BLD AUTO: 281 THOUSAND/UL (ref 140–400)
PMV BLD REES-ECKER: 11.1 FL (ref 7.5–12.5)
POTASSIUM SERPL-SCNC: 4.1 MMOL/L (ref 3.5–5.3)
PROT SERPL-MCNC: 7 G/DL (ref 6.1–8.1)
RBC # BLD AUTO: 4.8 MILLION/UL (ref 3.8–5.1)
SODIUM SERPL-SCNC: 140 MMOL/L (ref 135–146)
TRIGL SERPL-MCNC: 48 MG/DL
TSH SERPL-ACNC: 1.86 MIU/L (ref 0.4–4.5)
WBC # BLD AUTO: 4.5 THOUSAND/UL (ref 3.8–10.8)

## 2025-02-28 ENCOUNTER — TELEPHONE (OUTPATIENT)
Dept: PRIMARY CARE | Facility: CLINIC | Age: 70
End: 2025-02-28
Payer: MEDICARE

## 2025-03-05 DIAGNOSIS — R73.03 PREDIABETES: Primary | ICD-10-CM

## 2025-06-05 DIAGNOSIS — R73.03 PREDIABETES: ICD-10-CM

## 2025-07-03 LAB
EST. AVERAGE GLUCOSE BLD GHB EST-MCNC: 120 MG/DL
EST. AVERAGE GLUCOSE BLD GHB EST-SCNC: 6.6 MMOL/L
HBA1C MFR BLD: 5.8 %